# Patient Record
Sex: FEMALE | Race: WHITE | NOT HISPANIC OR LATINO | Employment: OTHER | URBAN - METROPOLITAN AREA
[De-identification: names, ages, dates, MRNs, and addresses within clinical notes are randomized per-mention and may not be internally consistent; named-entity substitution may affect disease eponyms.]

---

## 2019-08-13 ENCOUNTER — APPOINTMENT (EMERGENCY)
Dept: CT IMAGING | Facility: HOSPITAL | Age: 74
DRG: 372 | End: 2019-08-13
Payer: MEDICARE

## 2019-08-13 ENCOUNTER — HOSPITAL ENCOUNTER (INPATIENT)
Facility: HOSPITAL | Age: 74
LOS: 2 days | Discharge: HOME | DRG: 372 | End: 2019-08-16
Attending: EMERGENCY MEDICINE | Admitting: INTERNAL MEDICINE
Payer: MEDICARE

## 2019-08-13 DIAGNOSIS — K62.5 RECTAL BLEEDING: Primary | ICD-10-CM

## 2019-08-13 DIAGNOSIS — K52.9 COLITIS: ICD-10-CM

## 2019-08-13 PROBLEM — D72.829 LEUKOCYTOSIS: Status: ACTIVE | Noted: 2019-08-13

## 2019-08-13 PROBLEM — N32.81 OVERACTIVE BLADDER: Status: ACTIVE | Noted: 2019-08-13

## 2019-08-13 PROBLEM — E87.5 HYPERKALEMIA: Status: ACTIVE | Noted: 2019-08-13

## 2019-08-13 PROBLEM — Z86.39 HISTORY OF HYPOTHYROIDISM: Status: ACTIVE | Noted: 2019-08-13

## 2019-08-13 PROBLEM — Z86.79 HISTORY OF HYPERTENSION: Status: ACTIVE | Noted: 2019-08-13

## 2019-08-13 PROBLEM — N17.9 AKI (ACUTE KIDNEY INJURY) (HCC): Status: ACTIVE | Noted: 2019-08-13

## 2019-08-13 PROBLEM — R00.0 TACHYCARDIA: Status: ACTIVE | Noted: 2019-08-13

## 2019-08-13 LAB
ALBUMIN SERPL BCP-MCNC: 3.7 G/DL (ref 3.5–5)
ALP SERPL-CCNC: 75 U/L (ref 46–116)
ALT SERPL W P-5'-P-CCNC: 25 U/L (ref 12–78)
ANION GAP SERPL CALCULATED.3IONS-SCNC: 7 MMOL/L (ref 4–13)
APTT PPP: 27 SECONDS (ref 23–37)
AST SERPL W P-5'-P-CCNC: 23 U/L (ref 5–45)
BASOPHILS # BLD AUTO: 0.02 THOUSANDS/ΜL (ref 0–0.1)
BASOPHILS NFR BLD AUTO: 0 % (ref 0–1)
BILIRUB SERPL-MCNC: 0.9 MG/DL (ref 0.2–1)
BUN SERPL-MCNC: 36 MG/DL (ref 5–25)
CALCIUM SERPL-MCNC: 9.9 MG/DL (ref 8.3–10.1)
CHLORIDE SERPL-SCNC: 100 MMOL/L (ref 100–108)
CO2 SERPL-SCNC: 29 MMOL/L (ref 21–32)
CREAT SERPL-MCNC: 1.85 MG/DL (ref 0.6–1.3)
EOSINOPHIL # BLD AUTO: 0 THOUSAND/ΜL (ref 0–0.61)
EOSINOPHIL NFR BLD AUTO: 0 % (ref 0–6)
ERYTHROCYTE [DISTWIDTH] IN BLOOD BY AUTOMATED COUNT: 13 % (ref 11.6–15.1)
GFR SERPL CREATININE-BSD FRML MDRD: 27 ML/MIN/1.73SQ M
GLUCOSE SERPL-MCNC: 131 MG/DL (ref 65–140)
HCT VFR BLD AUTO: 40.9 % (ref 34.8–46.1)
HGB BLD-MCNC: 13.3 G/DL (ref 11.5–15.4)
IMM GRANULOCYTES # BLD AUTO: 0.03 THOUSAND/UL (ref 0–0.2)
IMM GRANULOCYTES NFR BLD AUTO: 0 % (ref 0–2)
INR PPP: 1.04 (ref 0.84–1.19)
LIPASE SERPL-CCNC: 183 U/L (ref 73–393)
LYMPHOCYTES # BLD AUTO: 0.55 THOUSANDS/ΜL (ref 0.6–4.47)
LYMPHOCYTES NFR BLD AUTO: 5 % (ref 14–44)
MCH RBC QN AUTO: 30.9 PG (ref 26.8–34.3)
MCHC RBC AUTO-ENTMCNC: 32.5 G/DL (ref 31.4–37.4)
MCV RBC AUTO: 95 FL (ref 82–98)
MONOCYTES # BLD AUTO: 0.41 THOUSAND/ΜL (ref 0.17–1.22)
MONOCYTES NFR BLD AUTO: 4 % (ref 4–12)
NEUTROPHILS # BLD AUTO: 9.29 THOUSANDS/ΜL (ref 1.85–7.62)
NEUTS SEG NFR BLD AUTO: 91 % (ref 43–75)
NRBC BLD AUTO-RTO: 0 /100 WBCS
PLATELET # BLD AUTO: 224 THOUSANDS/UL (ref 149–390)
PMV BLD AUTO: 11.1 FL (ref 8.9–12.7)
POTASSIUM SERPL-SCNC: 5.5 MMOL/L (ref 3.5–5.3)
PROT SERPL-MCNC: 7.3 G/DL (ref 6.4–8.2)
PROTHROMBIN TIME: 13 SECONDS (ref 11.6–14.5)
RBC # BLD AUTO: 4.31 MILLION/UL (ref 3.81–5.12)
SODIUM SERPL-SCNC: 136 MMOL/L (ref 136–145)
TSH SERPL DL<=0.05 MIU/L-ACNC: 6.08 UIU/ML (ref 0.36–3.74)
WBC # BLD AUTO: 10.3 THOUSAND/UL (ref 4.31–10.16)

## 2019-08-13 PROCEDURE — 99284 EMERGENCY DEPT VISIT MOD MDM: CPT | Performed by: PHYSICIAN ASSISTANT

## 2019-08-13 PROCEDURE — 85610 PROTHROMBIN TIME: CPT | Performed by: PHYSICIAN ASSISTANT

## 2019-08-13 PROCEDURE — 80053 COMPREHEN METABOLIC PANEL: CPT | Performed by: EMERGENCY MEDICINE

## 2019-08-13 PROCEDURE — 85730 THROMBOPLASTIN TIME PARTIAL: CPT | Performed by: PHYSICIAN ASSISTANT

## 2019-08-13 PROCEDURE — 99285 EMERGENCY DEPT VISIT HI MDM: CPT

## 2019-08-13 PROCEDURE — 99220 PR INITIAL OBSERVATION CARE/DAY 70 MINUTES: CPT | Performed by: INTERNAL MEDICINE

## 2019-08-13 PROCEDURE — 85025 COMPLETE CBC W/AUTO DIFF WBC: CPT | Performed by: EMERGENCY MEDICINE

## 2019-08-13 PROCEDURE — 74176 CT ABD & PELVIS W/O CONTRAST: CPT

## 2019-08-13 PROCEDURE — 84443 ASSAY THYROID STIM HORMONE: CPT | Performed by: PHYSICIAN ASSISTANT

## 2019-08-13 PROCEDURE — 1124F ACP DISCUSS-NO DSCNMKR DOCD: CPT | Performed by: INTERNAL MEDICINE

## 2019-08-13 PROCEDURE — 36415 COLL VENOUS BLD VENIPUNCTURE: CPT | Performed by: EMERGENCY MEDICINE

## 2019-08-13 PROCEDURE — 83690 ASSAY OF LIPASE: CPT | Performed by: PHYSICIAN ASSISTANT

## 2019-08-13 RX ORDER — HEPARIN SODIUM 5000 [USP'U]/ML
5000 INJECTION, SOLUTION INTRAVENOUS; SUBCUTANEOUS EVERY 8 HOURS SCHEDULED
Status: DISCONTINUED | OUTPATIENT
Start: 2019-08-13 | End: 2019-08-16 | Stop reason: HOSPADM

## 2019-08-13 RX ORDER — CIPROFLOXACIN 2 MG/ML
400 INJECTION, SOLUTION INTRAVENOUS ONCE
Status: COMPLETED | OUTPATIENT
Start: 2019-08-13 | End: 2019-08-13

## 2019-08-13 RX ORDER — LANOLIN ALCOHOL/MO/W.PET/CERES
3 CREAM (GRAM) TOPICAL
Status: DISCONTINUED | OUTPATIENT
Start: 2019-08-13 | End: 2019-08-16 | Stop reason: HOSPADM

## 2019-08-13 RX ORDER — CIPROFLOXACIN 2 MG/ML
400 INJECTION, SOLUTION INTRAVENOUS EVERY 24 HOURS
Status: DISCONTINUED | OUTPATIENT
Start: 2019-08-14 | End: 2019-08-16 | Stop reason: HOSPADM

## 2019-08-13 RX ORDER — SODIUM CHLORIDE 9 MG/ML
100 INJECTION, SOLUTION INTRAVENOUS CONTINUOUS
Status: DISCONTINUED | OUTPATIENT
Start: 2019-08-13 | End: 2019-08-16 | Stop reason: HOSPADM

## 2019-08-13 RX ADMIN — HEPARIN SODIUM 5000 UNITS: 5000 INJECTION INTRAVENOUS; SUBCUTANEOUS at 22:16

## 2019-08-13 RX ADMIN — CIPROFLOXACIN 400 MG: 2 INJECTION, SOLUTION INTRAVENOUS at 18:03

## 2019-08-13 RX ADMIN — SODIUM CHLORIDE 100 ML/HR: 0.9 INJECTION, SOLUTION INTRAVENOUS at 21:09

## 2019-08-13 RX ADMIN — METRONIDAZOLE 500 MG: 500 INJECTION, SOLUTION INTRAVENOUS at 17:02

## 2019-08-13 NOTE — H&P
H&P- Chuy Coulter 1945, 72 y o  female MRN: [id number]    Unit/Bed#: -01 Encounter: [id number]    Primary Care Provider: No primary care provider on file  Date and time admitted to hospital: 8/13/2019 12:07 PM        Colitis  Assessment & Plan  -multiple episodes of bloody diarrhea with abdominal pain  -CT-abd: positive for Acute Colitis  -con't IV Ciprofloxacin 200mg q24 and Flagyl IV 500mg q8  -con't IVF  -pending C  Diff toxin and Stool Enteric Bacterial panel  -consider GI consult if patient symptoms worsen   -follow up out patient with GI upon discharge  HERB (acute kidney injury) ([facility] [address] )  Assessment & Plan  -Cr 1 85; likely due to volume loss from diarrhea and vomitting  -con't IVF, monitor Cr daily  -avoid nephrotoxins; hold Lisinopril    Hyperkalemia  Assessment & Plan  -K 5 5  -repeat BMP pending, following IVF  -monitor      History of Overactive bladder  Assessment & Plan  -hold home med, Myrbetriq    History of hypertension  Assessment & Plan  -BP stable, con't to monitor  -hold lisinopril due to HERB    History of hypothyroidism  -pending TSH  -con't levothyroxine    VTE Prophylaxis: Heparin  / sequential compression device   Code Status: Full Code  POLST: not addressed  Discussion with family: Yes    Anticipated Length of Stay:  Patient will be admitted on an Observation basis with an anticipated length of stay of  1- 2 midnights  Justification for Hospital Stay: Colitis, on IV antibiotics and IVF for HERB    Total Time for Visit, including Counseling / Coordination of Care: 45 minutes  Greater than 50% of this total time spent on direct patient counseling and coordination of care  Chief Complaint:   Bloody diarrhea and abdominal pain    History of Present Illness:    Chuy Coulter is a 72 y o  female who presents to the ED with a cc of bloody diarrhea and abdominal pain   The patient is visiting from [place], she attended a BB last night where she had 4 strong mixed alcoholic drinks  Around midnight, she started having multiple episodes of bright red bloody diarrhea  It was assoc with diffuse abdominal pain, nausea and 1 episode of non-bloody vomitting  The patient's last episode of bloody diarrhea was downstairs in our ED  She admits to a similar episode 12 years ago, for which she was admitted to the hospital  She states they did a colonoscopy then, and diagnosed her with either Diverticulitis or Colitis, but she failed to follow up upon discharge  The patient denies that she is on any blood thinners  Review of Systems:    Review of Systems   Constitutional: Negative for appetite change, diaphoresis, fatigue and unexpected weight change  HENT: Negative for rhinorrhea, sinus pain and sore throat  Eyes: Negative for visual disturbance  Respiratory: Negative for cough, chest tightness and shortness of breath  Cardiovascular: Negative for chest pain and leg swelling  Gastrointestinal: Positive for blood in stool and diarrhea  Negative for abdominal distention, abdominal pain, nausea, rectal pain and vomiting  Genitourinary: Positive for frequency  Negative for hematuria and urgency  Musculoskeletal: Positive for arthralgias and back pain  Neurological: Negative for dizziness, weakness, light-headedness and headaches  Hematological: Does not bruise/bleed easily  Psychiatric/Behavioral: Negative for confusion         Past Medical and Surgical History:     PMH: HTN, Hypothyroidism, Scoliosis, Overactive bladder    PSH: multiple back/neck surgeries, C/S x2, tonsillectomy    Meds/Allergies:    Prior to Admission medications    Not on File     Med reviewed with patient, will also follow up with her pharmacy    Allergies: No Known Allergies    Social History:     Marital Status: /Civil Union   Patient Pre-hospital Living Situation: lives with  in [place]   Patient Pre-hospital Diet Restrictions: none  Substance Use History:   Social History     Substance and Sexual Activity   Alcohol Use Never    Frequency: Never     Social History     Tobacco Use   Smoking Status Never Smoker   Smokeless Tobacco Never Used     Social History     Substance and Sexual Activity   Drug Use Never       Family History:    non-contributory    Physical Exam:     Vitals:   Blood Pressure: 132/63 (08/13/19 1739)  Pulse: 89 (08/13/19 1739)  Temperature: 99 2 °F (37 3 °C) (08/13/19 1739)  Temp Source: Oral (08/13/19 1739)  Respirations: 18 (08/13/19 1739)  Height: 4' 11" (149 9 cm) (08/13/19 1739)  Weight - Scale: 55 9 kg (123 lb 3 8 oz) (08/13/19 1739)  SpO2: 96 % (08/13/19 1739)    Physical Exam   Constitutional: She is oriented to person, place, and time  She appears well-developed and well-nourished  HENT:   Head: Normocephalic and atraumatic  Eyes: Pupils are equal, round, and reactive to light  Conjunctivae and EOM are normal    Neck: Normal range of motion  Neck supple  Cardiovascular: Normal rate, regular rhythm and normal heart sounds  Pulmonary/Chest: Effort normal and breath sounds normal    Abdominal: Soft  Bowel sounds are normal  She exhibits no distension  There is tenderness  There is no rebound and no guarding  Mild epigastric tenderness with palpation   Neurological: She is alert and oriented to person, place, and time  Skin: Skin is warm and dry  Capillary refill takes less than 2 seconds  Psychiatric: She has a normal mood and affect  Additional Data:     Lab Results: I have personally reviewed pertinent reports        Results from last 7 days   Lab Units 08/13/19  1244   WBC Thousand/uL 10 30*   HEMOGLOBIN g/dL 13 3   HEMATOCRIT % 40 9   PLATELETS Thousands/uL 224   NEUTROS PCT % 91*   LYMPHS PCT % 5*   MONOS PCT % 4   EOS PCT % 0     Results from last 7 days   Lab Units 08/13/19  1244   SODIUM mmol/L 136   POTASSIUM mmol/L 5 5*   CHLORIDE mmol/L 100   CO2 mmol/L 29   BUN mg/dL 36*   CREATININE mg/dL 1 85*   ANION GAP mmol/L 7   CALCIUM mg/dL 9 9   ALBUMIN g/dL 3 7   TOTAL BILIRUBIN mg/dL 0 90   ALK PHOS U/L 75   ALT U/L 25   AST U/L 23   GLUCOSE RANDOM mg/dL 131     Results from last 7 days   Lab Units 08/13/19  1300   INR  1 04                   Imaging: I have personally reviewed pertinent reports  CT abdomen pelvis wo contrast   Final Result by Keturah Jerome MD (08/13 1513)      Findings most consistent with acute colitis with most likely diagnostic considerations including infectious colitis or inflammatory bowel disease  Workstation performed: [id number]                 [facility] Reviewed: Yes     ** Please Note: This note has been constructed using a voice recognition system   **

## 2019-08-13 NOTE — ED NOTES
Verbal to continue holding off on administering IV abx at this time       Sj Keating RN  08/13/19 7906

## 2019-08-13 NOTE — ED PROVIDER NOTES
History  Chief Complaint   Patient presents with    Rectal Bleeding     patient reports rectal bleeding and vomiting last night  thinks it started with a bowel movement  reports currently bleeding w/ pad on  daughter thinks she might have a fever from last night  28-year-old female presents to the emergency department with complaints of rectal bleeding  States she started with bleeding last night which she describes as bright red in nature and painless  States she has had diffuse abdominal pain as well as some nausea with at least 1 episode of vomiting  Describes pain as sharp and cramping in nature  No known fevers  No history of similar  Does not take any blood thinners  No history of similar symptoms  History provided by:  Patient   used: No        None       History reviewed  No pertinent past medical history  History reviewed  No pertinent surgical history  History reviewed  No pertinent family history  I have reviewed and agree with the history as documented  Social History     Tobacco Use    Smoking status: Never Smoker    Smokeless tobacco: Never Used   Substance Use Topics    Alcohol use: Never     Frequency: Never    Drug use: Never        Review of Systems   Constitutional: Negative for activity change, appetite change, chills and fever  HENT: Negative for congestion, dental problem, drooling, ear discharge, ear pain, mouth sores, nosebleeds, rhinorrhea, sore throat and trouble swallowing  Eyes: Negative for pain, discharge and itching  Respiratory: Negative for cough, chest tightness, shortness of breath and wheezing  Cardiovascular: Negative for chest pain and palpitations  Gastrointestinal: Positive for abdominal pain, blood in stool, diarrhea and nausea  Negative for constipation and vomiting  Endocrine: Negative for cold intolerance and heat intolerance     Genitourinary: Negative for difficulty urinating, dysuria, flank pain, frequency and urgency  Skin: Negative for rash and wound  Allergic/Immunologic: Negative for food allergies and immunocompromised state  Neurological: Negative for dizziness, seizures, syncope, weakness, numbness and headaches  Psychiatric/Behavioral: Negative for agitation, behavioral problems and confusion  Physical Exam  Physical Exam   Constitutional: She is oriented to person, place, and time  She appears well-developed and well-nourished  No distress  HENT:   Head: Normocephalic and atraumatic  Right Ear: External ear normal    Left Ear: External ear normal    Mouth/Throat: Oropharynx is clear and moist  No oropharyngeal exudate  Eyes: Conjunctivae are normal    Neck: No JVD present  No tracheal deviation present  Cardiovascular: Normal rate, regular rhythm and normal heart sounds  Exam reveals no gallop and no friction rub  No murmur heard  Pulmonary/Chest: Effort normal and breath sounds normal  No respiratory distress  She has no wheezes  She has no rales  She exhibits no tenderness  Abdominal: Soft  Bowel sounds are normal  She exhibits no distension  There is generalized tenderness  There is no guarding  Genitourinary: Rectal exam shows guaiac positive stool  Musculoskeletal: Normal range of motion  She exhibits no edema, tenderness or deformity  Lymphadenopathy:     She has no cervical adenopathy  Neurological: She is alert and oriented to person, place, and time  Skin: Skin is warm and dry  No rash noted  She is not diaphoretic  No erythema  Psychiatric: She has a normal mood and affect  Her behavior is normal    Nursing note and vitals reviewed        Vital Signs  ED Triage Vitals [08/13/19 1121]   Temperature Pulse Respirations Blood Pressure SpO2   98 3 °F (36 8 °C) 95 18 123/69 98 %      Temp Source Heart Rate Source Patient Position - Orthostatic VS BP Location FiO2 (%)   Oral Monitor Sitting Left arm --      Pain Score       --           Vitals:    08/13/19 1258 08/13/19 1523 08/13/19 1701 08/13/19 1739   BP: (!) 177/72 115/51 101/71 132/63   Pulse: 94 97 99 89   Patient Position - Orthostatic VS: Sitting Sitting Sitting          Visual Acuity      ED Medications  Medications   ciprofloxacin (CIPRO) IVPB (premix) 400 mg (has no administration in time range)   heparin (porcine) subcutaneous injection 5,000 Units (has no administration in time range)   metroNIDAZOLE (FLAGYL) IVPB (premix) 500 mg (500 mg Intravenous New Bag 8/13/19 1702)       Diagnostic Studies  Results Reviewed     Procedure Component Value Units Date/Time    CBC and differential [[id number]]  (Abnormal) Collected:  08/13/19 1244    Lab Status:  Final result Specimen:  Blood from Arm, Right Updated:  08/13/19 1318     WBC 10 30 Thousand/uL      RBC 4 31 Million/uL      Hemoglobin 13 3 g/dL      Hematocrit 40 9 %      MCV 95 fL      MCH 30 9 pg      MCHC 32 5 g/dL      RDW 13 0 %      MPV 11 1 fL      Platelets 262 Thousands/uL      nRBC 0 /100 WBCs      Neutrophils Relative 91 %      Immat GRANS % 0 %      Lymphocytes Relative 5 %      Monocytes Relative 4 %      Eosinophils Relative 0 %      Basophils Relative 0 %      Neutrophils Absolute 9 29 Thousands/µL      Immature Grans Absolute 0 03 Thousand/uL      Lymphocytes Absolute 0 55 Thousands/µL      Monocytes Absolute 0 41 Thousand/µL      Eosinophils Absolute 0 00 Thousand/µL      Basophils Absolute 0 02 Thousands/µL     Narrative: This is an appended report  These results have been appended to a previously verified report      Protime-INR [[id number]]  (Normal) Collected:  08/13/19 1300    Lab Status:  Final result Specimen:  Blood from Arm, Right Updated:  08/13/19 1314     Protime 13 0 seconds      INR 1 04    APTT [[id number]]  (Normal) Collected:  08/13/19 1300    Lab Status:  Final result Specimen:  Blood from Arm, Right Updated:  08/13/19 1314     PTT 27 seconds     Lipase [[id number]]  (Normal) Collected:  08/13/19 1300    Lab Status:  Final result Specimen:  Blood from Arm, Right Updated:  08/13/19 1311     Lipase 183 u/L     Comprehensive metabolic panel [[id number]]  (Abnormal) Collected:  08/13/19 1244    Lab Status:  Final result Specimen:  Blood from Arm, Right Updated:  08/13/19 1304     Sodium 136 mmol/L      Potassium 5 5 mmol/L      Chloride 100 mmol/L      CO2 29 mmol/L      ANION GAP 7 mmol/L      BUN 36 mg/dL      Creatinine 1 85 mg/dL      Glucose 131 mg/dL      Calcium 9 9 mg/dL      AST 23 U/L      ALT 25 U/L      Alkaline Phosphatase 75 U/L      Total Protein 7 3 g/dL      Albumin 3 7 g/dL      Total Bilirubin 0 90 mg/dL      eGFR 27 ml/min/1 73sq m     Narrative:       [place] guidelines for Chronic Kidney Disease (CKD):     Stage 1 with normal or high GFR (GFR > 90 mL/min/1 73 square meters)    Stage 2 Mild CKD (GFR = 60-89 mL/min/1 73 square meters)    Stage 3A Moderate CKD (GFR = 45-59 mL/min/1 73 square meters)    Stage 3B Moderate CKD (GFR = 30-44 mL/min/1 73 square meters)    Stage 4 Severe CKD (GFR = 15-29 mL/min/1 73 square meters)    Stage 5 End Stage CKD (GFR <15 mL/min/1 73 square meters)  Note: GFR calculation is accurate only with a steady state creatinine                 CT abdomen pelvis wo contrast   Final Result by Alejandro Burgos MD (08/13 1513)      Findings most consistent with acute colitis with most likely diagnostic considerations including infectious colitis or inflammatory bowel disease  Workstation performed: [id number]                    Procedures  Procedures       ED Course  ED Course as of Aug 13 1752   Tue Aug 13, 2019   1618 Discussed results with patient  Agreeable to inpatient admission, but not to observation  Will discuss with hospitalist       [phone] with SL[facility]  Unsure if patient handy qualify as inpatient initially  Recommends speaking with case management                        Initial Sepsis Screening     Row Name 08/13/19 1700                Is the patient's history suggestive of a new or worsening infection? (!) Yes (Proceed)  -EP        Suspected source of infection  --        Are two or more of the following signs & symptoms of infection both present and new to the patient? No  -EP        Indicate SIRS criteria  --        If the answer is yes to both questions, suspicion of sepsis is present  --        If severe sepsis is present AND tissue hypoperfusion perists in the hour after fluid resuscitation or lactate > 4, the patient meets criteria for SEPTIC SHOCK  --        Are any of the following organ dysfunction criteria present within 6 hours of suspected infection and SIRS criteria that are NOT considered to be chronic conditions?   --        Organ dysfunction  --        Date of presentation of severe sepsis  --        Time of presentation of severe sepsis  --        Tissue hypoperfusion persists in the hour after crystalloid fluid administration, evidenced, by either:  --        Was hypotension present within one hour of the conclusion of crystalloid fluid administration?  --        Date of presentation of septic shock  --        Time of presentation of septic shock  --          User Key  (r) = Recorded By, (t) = Taken By, (c) = Cosigned By    [facility] [address] Name Provider Brennan Fuentes MD Physician                  MDM  Number of Diagnoses or Management Options  Colitis:   Rectal bleeding:   Diagnosis management comments: Differential diagnosis includes but not limited to:  Colitis, gastroenteritis, anemia, intra-abdominal infection         Amount and/or Complexity of Data Reviewed  Clinical lab tests: ordered and reviewed  Tests in the radiology section of CPT®: ordered and reviewed  Review and summarize past medical records: yes        Disposition  Final diagnoses:   Rectal bleeding   Colitis     Time reflects when diagnosis was documented in both MDM as applicable and the Disposition within this note     Time User Action Codes Description Comment    8/13/2019  4:53 PM Jake Jarvis Add [K62 5] Rectal bleeding     8/13/2019  4:53 PM Elaine Roman Add [K52 9] Colitis       ED Disposition     ED Disposition Condition Date/Time Comment    Admit Stable Tue Aug 13, 2019  4:53 PM Case was discussed with CHET and the patient's admission status was agreed to be Admission Status: observation status to the service of Dr Ch Daily   Follow-up Information    None         There are no discharge medications for this patient  No discharge procedures on file      ED Provider  Electronically Signed by           Geneva Banegas PA-C  08/13/19 2849

## 2019-08-13 NOTE — ED NOTES
Patient asking if she can have water, MAISHA Roman made aware and verbal okay  Patient provided with ice water, no there complaints at this time       Jerry Melendez RN  08/13/19 8482

## 2019-08-13 NOTE — ASSESSMENT & PLAN NOTE
-multiple episodes of bloody diarrhea with abdominal pain  -CT-abd: positive for Acute Colitis  -con't IV Ciprofloxacin 200mg q24 and Flagyl IV 500mg q8  -con't IVF  -pending C  Diff toxin and Stool Enteric Bacterial panel  -consider GI consult if patient symptoms worsen   -follow up out patient with GI upon discharge

## 2019-08-13 NOTE — ED NOTES
Verbal per MAISHA HANNA [facility], hold off on administering flagyl and cipro IV abx until verbal okay to hang by provider        Evelyn Smith RN  08/13/19 0428

## 2019-08-13 NOTE — PLAN OF CARE
Problem: Potential for Falls  Goal: Patient will remain free of falls  Description  INTERVENTIONS:  - Assess patient frequently for physical needs  -  Identify cognitive and physical deficits and behaviors that affect risk of falls    -  [place] fall precautions as indicated by assessment   - Educate patient/family on patient safety including physical limitations  - Instruct patient to call for assistance with activity based on assessment  - Modify environment to reduce risk of injury  - Consider OT/PT consult to assist with strengthening/mobility  Outcome: Progressing     Problem: PAIN - ADULT  Goal: Verbalizes/displays adequate comfort level or baseline comfort level  Description  Interventions:  - Encourage patient to monitor pain and request assistance  - Assess pain using appropriate pain scale  - Administer analgesics based on type and severity of pain and evaluate response  - Implement non-pharmacological measures as appropriate and evaluate response  - Consider cultural and social influences on pain and pain management  - Notify physician/advanced practitioner if interventions unsuccessful or patient reports new pain  Outcome: Progressing     Problem: INFECTION - ADULT  Goal: Absence or prevention of progression during hospitalization  Description  INTERVENTIONS:  - Assess and monitor for signs and symptoms of infection  - Monitor lab/diagnostic results  - Monitor all insertion sites, i e  indwelling lines, tubes, and drains  - Monitor endotracheal if appropriate and nasal secretions for changes in amount and color  - [place] appropriate cooling/warming therapies per order  - Administer medications as ordered  - Instruct and encourage patient and family to use good hand hygiene technique  - Identify and instruct in appropriate isolation precautions for identified infection/condition  Outcome: Progressing  Goal: Absence of fever/infection during neutropenic period  Description  INTERVENTIONS:  - Monitor WBC    Outcome: Progressing     Problem: SAFETY ADULT  Goal: Patient will remain free of falls  Description  INTERVENTIONS:  - Assess patient frequently for physical needs  -  Identify cognitive and physical deficits and behaviors that affect risk of falls    -  [place] fall precautions as indicated by assessment   - Educate patient/family on patient safety including physical limitations  - Instruct patient to call for assistance with activity based on assessment  - Modify environment to reduce risk of injury  - Consider OT/PT consult to assist with strengthening/mobility  Outcome: Progressing  Goal: Maintain or return to baseline ADL function  Description  INTERVENTIONS:  -  Assess patient's ability to carry out ADLs; assess patient's baseline for ADL function and identify physical deficits which impact ability to perform ADLs (bathing, care of mouth/teeth, toileting, grooming, dressing, etc )  - Assess/evaluate cause of self-care deficits   - Assess range of motion  - Assess patient's mobility; develop plan if impaired  - Assess patient's need for assistive devices and provide as appropriate  - Encourage maximum independence but intervene and supervise when necessary  - Involve family in performance of ADLs  - Assess for home care needs following discharge   - Consider OT consult to assist with ADL evaluation and planning for discharge  - Provide patient education as appropriate  Outcome: Progressing  Goal: Maintain or return mobility status to optimal level  Description  INTERVENTIONS:  - Assess patient's baseline mobility status (ambulation, transfers, stairs, etc )    - Identify cognitive and physical deficits and behaviors that affect mobility  - Identify mobility aids required to assist with transfers and/or ambulation (gait belt, sit-to-stand, lift, walker, cane, etc )  - [place] fall precautions as indicated by assessment  - Record patient progress and toleration of activity level on Mobility SBAR; progress patient to next Phase/Stage  - Instruct patient to call for assistance with activity based on assessment  - Consider rehabilitation consult to assist with strengthening/weightbearing, etc   Outcome: Progressing     Problem: DISCHARGE PLANNING  Goal: Discharge to home or other facility with appropriate resources  Description  INTERVENTIONS:  - Identify barriers to discharge w/patient and caregiver  - Arrange for needed discharge resources and transportation as appropriate  - Identify discharge learning needs (meds, wound care, etc )  - Arrange for interpretive services to assist at discharge as needed  - Refer to Case Management Department for coordinating discharge planning if the patient needs post-hospital services based on physician/advanced practitioner order or complex needs related to functional status, cognitive ability, or social support system  Outcome: Progressing     Problem: Knowledge Deficit  Goal: Patient/family/caregiver demonstrates understanding of disease process, treatment plan, medications, and discharge instructions  Description  Complete learning assessment and assess knowledge base    Interventions:  - Provide teaching at level of understanding  - Provide teaching via preferred learning methods  Outcome: Progressing

## 2019-08-13 NOTE — ASSESSMENT & PLAN NOTE
-Cr 1 85; likely due to volume loss from diarrhea and vomitting  -con't IVF, monitor Cr daily  -avoid nephrotoxins; hold Lisinopril

## 2019-08-14 LAB
ALBUMIN SERPL BCP-MCNC: 2.9 G/DL (ref 3.5–5)
ALP SERPL-CCNC: 57 U/L (ref 46–116)
ALT SERPL W P-5'-P-CCNC: 24 U/L (ref 12–78)
ANION GAP SERPL CALCULATED.3IONS-SCNC: 8 MMOL/L (ref 4–13)
AST SERPL W P-5'-P-CCNC: 24 U/L (ref 5–45)
BILIRUB SERPL-MCNC: 0.6 MG/DL (ref 0.2–1)
BUN SERPL-MCNC: 30 MG/DL (ref 5–25)
CALCIUM SERPL-MCNC: 8.5 MG/DL (ref 8.3–10.1)
CHLORIDE SERPL-SCNC: 107 MMOL/L (ref 100–108)
CO2 SERPL-SCNC: 26 MMOL/L (ref 21–32)
CREAT SERPL-MCNC: 1.34 MG/DL (ref 0.6–1.3)
ERYTHROCYTE [DISTWIDTH] IN BLOOD BY AUTOMATED COUNT: 13.2 % (ref 11.6–15.1)
GFR SERPL CREATININE-BSD FRML MDRD: 40 ML/MIN/1.73SQ M
GLUCOSE SERPL-MCNC: 110 MG/DL (ref 65–140)
HCT VFR BLD AUTO: 32.6 % (ref 34.8–46.1)
HCT VFR BLD AUTO: 33.9 % (ref 34.8–46.1)
HGB BLD-MCNC: 10.5 G/DL (ref 11.5–15.4)
HGB BLD-MCNC: 10.8 G/DL (ref 11.5–15.4)
MCH RBC QN AUTO: 30.3 PG (ref 26.8–34.3)
MCHC RBC AUTO-ENTMCNC: 31.9 G/DL (ref 31.4–37.4)
MCV RBC AUTO: 95 FL (ref 82–98)
PLATELET # BLD AUTO: 172 THOUSANDS/UL (ref 149–390)
PMV BLD AUTO: 10.9 FL (ref 8.9–12.7)
POTASSIUM SERPL-SCNC: 4.6 MMOL/L (ref 3.5–5.3)
PROT SERPL-MCNC: 5.7 G/DL (ref 6.4–8.2)
RBC # BLD AUTO: 3.57 MILLION/UL (ref 3.81–5.12)
SODIUM SERPL-SCNC: 141 MMOL/L (ref 136–145)
WBC # BLD AUTO: 7.67 THOUSAND/UL (ref 4.31–10.16)

## 2019-08-14 PROCEDURE — 85014 HEMATOCRIT: CPT | Performed by: INTERNAL MEDICINE

## 2019-08-14 PROCEDURE — 80053 COMPREHEN METABOLIC PANEL: CPT | Performed by: INTERNAL MEDICINE

## 2019-08-14 PROCEDURE — 87493 C DIFF AMPLIFIED PROBE: CPT | Performed by: INTERNAL MEDICINE

## 2019-08-14 PROCEDURE — 99222 1ST HOSP IP/OBS MODERATE 55: CPT | Performed by: INTERNAL MEDICINE

## 2019-08-14 PROCEDURE — 87505 NFCT AGENT DETECTION GI: CPT | Performed by: INTERNAL MEDICINE

## 2019-08-14 PROCEDURE — 85027 COMPLETE CBC AUTOMATED: CPT | Performed by: INTERNAL MEDICINE

## 2019-08-14 PROCEDURE — 85018 HEMOGLOBIN: CPT | Performed by: INTERNAL MEDICINE

## 2019-08-14 PROCEDURE — 99225 PR SBSQ OBSERVATION CARE/DAY 25 MINUTES: CPT | Performed by: INTERNAL MEDICINE

## 2019-08-14 RX ADMIN — METRONIDAZOLE 500 MG: 500 INJECTION, SOLUTION INTRAVENOUS at 23:40

## 2019-08-14 RX ADMIN — METRONIDAZOLE 500 MG: 500 INJECTION, SOLUTION INTRAVENOUS at 00:04

## 2019-08-14 RX ADMIN — HEPARIN SODIUM 5000 UNITS: 5000 INJECTION INTRAVENOUS; SUBCUTANEOUS at 05:32

## 2019-08-14 RX ADMIN — HEPARIN SODIUM 5000 UNITS: 5000 INJECTION INTRAVENOUS; SUBCUTANEOUS at 21:15

## 2019-08-14 RX ADMIN — SODIUM CHLORIDE 100 ML/HR: 0.9 INJECTION, SOLUTION INTRAVENOUS at 15:52

## 2019-08-14 RX ADMIN — METRONIDAZOLE 500 MG: 500 INJECTION, SOLUTION INTRAVENOUS at 09:01

## 2019-08-14 RX ADMIN — MELATONIN 3 MG: at 21:22

## 2019-08-14 RX ADMIN — METRONIDAZOLE 500 MG: 500 INJECTION, SOLUTION INTRAVENOUS at 15:53

## 2019-08-14 RX ADMIN — HEPARIN SODIUM 5000 UNITS: 5000 INJECTION INTRAVENOUS; SUBCUTANEOUS at 13:44

## 2019-08-14 RX ADMIN — SODIUM CHLORIDE 100 ML/HR: 0.9 INJECTION, SOLUTION INTRAVENOUS at 05:44

## 2019-08-14 RX ADMIN — CIPROFLOXACIN 400 MG: 2 INJECTION, SOLUTION INTRAVENOUS at 17:45

## 2019-08-14 NOTE — ASSESSMENT & PLAN NOTE
-Cr 1 85; likely due to volume loss from diarrhea and vomitting --> Resolving  -con't IVF, monitor Cr daily  -avoid nephrotoxins; hold Lisinopril

## 2019-08-14 NOTE — PROGRESS NOTES
Called by nursing patient with feeling of bloating  Also found to have bright red blood in her pad  Suspected rectal bleeding  Patient denies nausea vomiting  She is complaining some bloating  Vitals:    08/14/19 0627   BP: 134/68   Pulse: 91   Resp: 18   Temp: 98 6 °F (37 °C)   SpO2: 96%   Physical Exam     Patient is alert oriented nontoxic appearing very comfortable speaking with friend at bedside sitting up in chair  Lungs clear to auscultation bilaterally  Cardiac regular rhythm no S3  Abdomen soft nontender mildly distended compared to earlier today no guarding no rebound normoactive bowel sounds  Extremities no cyanosis clubbing or edema  Assessment/Plan     Patient with continued rectal bleeding secondary to colitis  Questionable infectious versus ischemic versus inflammatory    Due to continued rectal bleeding and bloating will changed to clear liquid diet will also consult GI patient also on stable for discharge would be transferred to inpatient status

## 2019-08-14 NOTE — CONSULTS
Consultation - [facility]) [facility]  Scooter Pardo 72 y o  female MRN: [id number]  Unit/Bed#: -01 Encounter: [id number]        Inpatient consult to gastroenterology  Consult performed by: Keaton Plascnecia PA-C  Consult ordered by: Nolberto Mckee MD          Reason for Consult / Principal Problem: Colitis    HPI: Scooter Pardo is a 72y o  year old female reporting no significant medical history who presented to the emergency room yesterday morning with complaints of rectal bleeding and vomiting which started the previous night  She 1st noticed it when she was having a bowel movement and noticed bright red blood mixed with movement  She later developed some diffuse abdominal pain as well as nausea and an episode of vomiting  She said the pain was sharp and cramping  Upon presentation, white count was 10 3, she appears to have been afebrile  CT scan of the abdomen and pelvis revealed evidence of colitis extending from the area of hepatic flexure continuously to the rectosigmoid region  The patient says she only had 1 small bowel movement so far since presentation  Still has some mild abdominal discomfort but nothing worsening  She said that her symptoms started after a picnic; she does not recall eating anything unusual or undercooked though and does not think anybody else was sick since the picnic  She was here visiting from [place]  She denies any recent antibiotics use  She says her last colonoscopy was about 10 years ago and this was normal     REVIEW OF SYSTEMS:    CONSTITUTIONAL: Denies any fever, chills, or rigors  Good appetite, and no recent weight loss  HEENT: No earache or tinnitus  Denies hearing loss or visual disturbances  CARDIOVASCULAR: No chest pain or palpitations  RESPIRATORY: Denies any cough, hemoptysis, shortness of breath or dyspnea on exertion  GASTROINTESTINAL: As noted in the History of Present Illness  GENITOURINARY: No problems with urination  Denies any hematuria or dysuria  NEUROLOGIC: No dizziness or vertigo, denies headaches  MUSCULOSKELETAL: Denies any muscle or joint pain  SKIN: Denies skin rashes or itching  ENDOCRINE: Denies excessive thirst  Denies intolerance to heat or cold  PSYCHOSOCIAL: Denies depression or anxiety  Denies any recent memory loss  Historical Information   History reviewed  No pertinent past medical history  History reviewed  No pertinent surgical history  Social History   Social History     Substance and Sexual Activity   Alcohol Use Never    Frequency: Never     Social History     Substance and Sexual Activity   Drug Use Never     Social History     Tobacco Use   Smoking Status Never Smoker   Smokeless Tobacco Never Used     History reviewed  No pertinent family history  Meds/Allergies     No medications prior to admission  Current Facility-Administered Medications   Medication Dose Route Frequency    ciprofloxacin (CIPRO) IVPB (premix) 400 mg  400 mg Intravenous Q24H    heparin (porcine) subcutaneous injection 5,000 Units  5,000 Units Subcutaneous Q8H [address]    melatonin tablet 3 mg  3 mg Oral HS PRN    metroNIDAZOLE (FLAGYL) IVPB (premix) 500 mg  500 mg Intravenous Q8H    sodium chloride 0 9 % infusion  100 mL/hr Intravenous Continuous       No Known Allergies        Objective     Blood pressure 134/68, pulse 91, temperature 98 6 °F (37 °C), temperature source Oral, resp  rate 18, height 4' 11" (1 499 m), weight 54 6 kg (120 lb 5 9 oz), SpO2 96 %      No intake or output data in the 24 hours ending 08/14/19 1344      PHYSICAL EXAM     General Appearance:   Alert, cooperative, no distress, appears stated age    HEENT:   Normocephalic, atraumatic, anicteric      Neck:  Supple, symmetrical, trachea midline, no adenopathy;    thyroid: no enlargement/tenderness/nodules; no carotid  bruit or JVD    Lungs:   Clear to auscultation bilaterally; no rales, rhonchi or wheezing; respirations unlabored    Heart[de-identified]   S1 and S2 normal; regular rate and rhythm; no murmur, rub, or gallop     Abdomen:   Soft, non-tender, non-distended; normal bowel sounds; no masses, no organomegaly    Genitalia:   Deferred    Rectal:   Deferred    Extremities:  No cyanosis, clubbing or edema    Pulses:  2+ and symmetric all extremities    Skin:  Skin color, texture, turgor normal, no rashes or lesions    Lymph nodes:  No palpable cervical, axillary or inguinal lymphadenopathy        Lab Results:   Admission on 08/13/2019   Component Date Value    WBC 08/13/2019 10 30*    RBC 08/13/2019 4 31     Hemoglobin 08/13/2019 13 3     Hematocrit 08/13/2019 40 9     MCV 08/13/2019 95     MCH 08/13/2019 30 9     MCHC 08/13/2019 32 5     RDW 08/13/2019 13 0     MPV 08/13/2019 11 1     Platelets 72/77/2131 224     nRBC 08/13/2019 0     Neutrophils Relative 08/13/2019 91*    Immat GRANS % 08/13/2019 0     Lymphocytes Relative 08/13/2019 5*    Monocytes Relative 08/13/2019 4     Eosinophils Relative 08/13/2019 0     Basophils Relative 08/13/2019 0     Neutrophils Absolute 08/13/2019 9 29*    Immature Grans Absolute 08/13/2019 0 03     Lymphocytes Absolute 08/13/2019 0 55*    Monocytes Absolute 08/13/2019 0 41     Eosinophils Absolute 08/13/2019 0 00     Basophils Absolute 08/13/2019 0 02     Sodium 08/13/2019 136     Potassium 08/13/2019 5 5*    Chloride 08/13/2019 100     CO2 08/13/2019 29     ANION GAP 08/13/2019 7     BUN 08/13/2019 36*    Creatinine 08/13/2019 1 85*    Glucose 08/13/2019 131     Calcium 08/13/2019 9 9     AST 08/13/2019 23     ALT 08/13/2019 25     Alkaline Phosphatase 08/13/2019 75     Total Protein 08/13/2019 7 3     Albumin 08/13/2019 3 7     Total Bilirubin 08/13/2019 0 90     eGFR 08/13/2019 27     Protime 08/13/2019 13 0     INR 08/13/2019 1 04     PTT 08/13/2019 27     Lipase 08/13/2019 183     TSH 3RD GENERATON 08/13/2019 6 076*    Sodium 08/14/2019 141     Potassium 08/14/2019 4 6     Chloride 08/14/2019 107     CO2 08/14/2019 26     ANION GAP 08/14/2019 8     BUN 08/14/2019 30*    Creatinine 08/14/2019 1 34*    Glucose 08/14/2019 110     Calcium 08/14/2019 8 5     AST 08/14/2019 24     ALT 08/14/2019 24     Alkaline Phosphatase 08/14/2019 57     Total Protein 08/14/2019 5 7*    Albumin 08/14/2019 2 9*    Total Bilirubin 08/14/2019 0 60     eGFR 08/14/2019 40     WBC 08/14/2019 7 67     RBC 08/14/2019 3 57*    Hemoglobin 08/14/2019 10 8*    Hematocrit 08/14/2019 33 9*    MCV 08/14/2019 95     MCH 08/14/2019 30 3     MCHC 08/14/2019 31 9     RDW 08/14/2019 13 2     Platelets 78/00/3486 172     MPV 08/14/2019 10 9        Imaging Studies: I have personally reviewed pertinent reports  CT ABDOMEN AND PELVIS WITHOUT IV CONTRAST     INDICATION:   pain  Rectal bleeding  Vomiting  Diarrhea      COMPARISON:  None      TECHNIQUE:  CT examination of the abdomen and pelvis was performed without intravenous contrast   Axial, sagittal, and coronal 2D reformatted images were created from the source data and submitted for interpretation       Radiation dose length product (DLP) for this visit:  286 mGy-cm   This examination, like all CT scans performed in the [facility], was performed utilizing techniques to minimize radiation dose exposure, including the use of iterative   reconstruction and automated exposure control       Enteric contrast was not administered in accordance with physician request       FINDINGS:     ABDOMEN     LOWER CHEST:  No clinically significant abnormality identified in the visualized lower chest      LIVER/BILIARY TREE:  Unremarkable      GALLBLADDER:  No calcified gallstones  No pericholecystic inflammatory change      SPLEEN:  Unremarkable      PANCREAS:  Unremarkable      ADRENAL GLANDS:  Unremarkable      KIDNEYS/URETERS:  Unremarkable   No hydronephrosis      STOMACH AND BOWEL:  Evaluation of bowel is somewhat limited as a result of absent intravenous and enteric contrast   However, there is apparent bowel wall thickening involving a long segment of large intestine from hepatic flecture through rectosigmoid   colon in a pattern most consistent with colitis  There is mild pericolonic inflammatory edema in the region of the splenic flecture  No pericolonic abscess  No free intraperitoneal gas  No bowel obstruction  Unremarkable appearance of stomach and   small intestine      APPENDIX:  No findings to suggest appendicitis      ABDOMINOPELVIC CAVITY:  No ascites or free intraperitoneal air  No lymphadenopathy      VESSELS:  Unremarkable for patient's age      PELVIS     REPRODUCTIVE ORGANS:  Unremarkable for patient's age      URINARY BLADDER:  Unremarkable      ABDOMINAL WALL/INGUINAL REGIONS:  Unremarkable      OSSEOUS STRUCTURES:  No acute fracture or destructive osseous lesion  Thoracolumbar scoliotic deformity with multilevel degenerative changes noted  Fusion of posterior elements is noted throughout the lower lumbar spine      IMPRESSION:     Findings most consistent with acute colitis with most likely diagnostic considerations including infectious colitis or inflammatory bowel disease           ASSESSMENT/PLAN:     1  Acute onset of abdominal pain, diarrhea and CT scan showing evidence of colitis from the hepatic flexure extending into the rectosigmoid region  Clinically appears most consistent with an infectious colitis, viral versus bacterial, and uncomplicated at this time      - check stool enteric panel, leukocytes, C diff  - agree with bowel rest, clear liquid diet for now, gradually advance to low residue diet as patient tolerates, would recommend lactose restriction in the meantime  - reasonable to continue empiric antibiotics for now but may consider discontinuing if stool tests are negative, patient persist without fevers or significant leukocytosis, etc  - monitor abdominal exam, temperature, white blood cell count  - she should have colonoscopy in several weeks as an outpatient, she says that she has not had one done for at least 10 years    The patient was seen and examined by Dr Santo Menezes, all donaldson medical decisions were made with Dr Santo Menezes  Thank you for allowing us to participate in the care of this pleasant patient  We will follow up with you closely

## 2019-08-14 NOTE — UTILIZATION REVIEW
Initial Clinical Review    Admission: Date/Time/Statement: 8/13/2019  1653 OBSERVATION AND CHANGED 8/14/2019  1328 INPATIENT RE:   Has failed observation with continued rectal bleeding secondary to colitis with down grade of diet to clears and GI consult  Start   Ordered   08/14/19 1329  Inpatient Admission Once     Transfer Service: General Medicine    Expected Discharge Date: 08/16/19       Question Answer Comment   Admitting Physician Jovi Blum    Level of Care Med Surg    Estimated length of stay More than 2 Midnights    Certification I certify that inpatient services are medically necessary for this patient for a duration of greater than two midnights  See H&P and MD Progress Notes for additional information about the patient's course of treatment  08/14/19 1328       ED Arrival Information     Expected Arrival Acuity Means of Arrival Escorted By Service Admission Type    - 8/13/2019 11:02 Urgent Walk-In Family Member Hospitalist Urgent    Arrival Complaint    rectal bleeding        Chief Complaint   Patient presents with    Rectal Bleeding     patient reports rectal bleeding and vomiting last night  thinks it started with a bowel movement  reports currently bleeding w/ pad on  daughter thinks she might have a fever from last night  Assessment/Plan: This is a 72year old female who presents with diarrhea for 2 days and HERB     Colitis  Will check enteric panel and c/w abx for now   Can likely DC in 24 hours if no temp or WCC  Will check C-diff     Also will give IVF and recheck BMP in am    ED Triage Vitals   Temperature Pulse Respirations Blood Pressure SpO2   08/13/19 1121 08/13/19 1121 08/13/19 1121 08/13/19 1121 08/13/19 1121   98 3 °F (36 8 °C) 95 18 123/69 98 %      Temp Source Heart Rate Source Patient Position - Orthostatic VS BP Location FiO2 (%)   08/13/19 1121 08/13/19 1121 08/13/19 1121 08/13/19 1121 --   Oral Monitor Sitting Left arm       Pain Score       08/13/19 5874 No Pain        Wt Readings from Last 1 Encounters:   08/14/19 54 6 kg (120 lb 5 9 oz)     Additional Vital Signs:   08/14/19 0627  98 6 °F (37 °C)  91  18  134/68  96 %  None (Room air)  Lying   08/13/19 2300  98 8 °F (37 1 °C)  84  18  128/70  95 %  None (Room air)  Lying   08/13/19 1739  99 2 °F (37 3 °C)  89  18  132/63  96 %  None (Room air)  --   08/13/19 1701  --  99  18  101/71  97 %  None (Room air)  Sitting   08/13/19 1523  --  97  18  115/51  95 %  None (Room air)         Pertinent Labs/Diagnostic Test Results:   8/13/201i9 Ct abdomen - Findings most consistent with acute colitis with most likely diagnostic considerations including infectious colitis or inflammatory bowel disease  Results from last 7 days   Lab Units 08/14/19  0545 08/13/19  1244   WBC Thousand/uL 7 67 10 30*   HEMOGLOBIN g/dL 10 8* 13 3   HEMATOCRIT % 33 9* 40 9   PLATELETS Thousands/uL 172 224   NEUTROS ABS Thousands/µL  --  9 29*     Results from last 7 days   Lab Units 08/14/19  0545 08/13/19  1244   SODIUM mmol/L 141 136   POTASSIUM mmol/L 4 6 5 5*   CHLORIDE mmol/L 107 100   CO2 mmol/L 26 29   ANION GAP mmol/L 8 7   BUN mg/dL 30* 36*   CREATININE mg/dL 1 34* 1 85*   EGFR ml/min/1 73sq m 40 27   CALCIUM mg/dL 8 5 9 9     Results from last 7 days   Lab Units 08/14/19  0545 08/13/19  1244   AST U/L 24 23   ALT U/L 24 25   ALK PHOS U/L 57 75   TOTAL PROTEIN g/dL 5 7* 7 3   ALBUMIN g/dL 2 9* 3 7   TOTAL BILIRUBIN mg/dL 0 60 0 90     Results from last 7 days   Lab Units 08/14/19  0545 08/13/19  1244   GLUCOSE RANDOM mg/dL 110 131     Results from last 7 days   Lab Units 08/13/19  1300   PROTIME seconds 13 0   INR  1 04   PTT seconds 27     Results from last 7 days   Lab Units 08/13/19  1300   TSH 3RD GENERATON uIU/mL 6 076*     Results from last 7 days   Lab Units 08/13/19  1300   LIPASE u/L 183       ED Treatment:   Medication Administration from 08/13/2019 1102 to 08/13/2019 1887       Date/Time Order Dose Route Action Comments 08/13/2019 1702 metroNIDAZOLE (FLAGYL) IVPB (premix) 500 mg 500 mg Intravenous New Bag verbal per MAISHA Lucia to start abx at this time  History reviewed  No pertinent past medical history  Present on Admission:  **None**      Admitting Diagnosis: Colitis [K52 9]  Rectal bleeding [K62 5]  Age/Sex: 72 y o  female  Admission Orders:  8/13/2019  1653 OBSERVATION AND CHANGED INPATIENT 8/14/2019  1328     Current Facility-Administered Medications:  ciprofloxacin 400 mg Intravenous Q24H    heparin (porcine) 5,000 Units Subcutaneous Q8H [facility]    melatonin 3 mg Oral HS PRN    metroNIDAZOLE 500 mg Intravenous Q8H Last Rate: 500 mg (08/14/19 0004)   sodium chloride 100 mL/hr Intravenous Continuous Last Rate: 100 mL/hr (08/14/19 0544)         Network Utilization Review Department  Phone: [phone]; Fax [phone]  [email or web address]  ATTENTION: Please call with any questions or concerns to [phone]  and carefully listen to the prompts so that you are directed to the right person  Send all requests for admission clinical reviews, approved or denied determinations and any other requests to fax [phone]   All voicemails are confidential

## 2019-08-14 NOTE — PLAN OF CARE
Problem: Potential for Falls  Goal: Patient will remain free of falls  Description  INTERVENTIONS:  - Assess patient frequently for physical needs  -  Identify cognitive and physical deficits and behaviors that affect risk of falls    -  [place] fall precautions as indicated by assessment   - Educate patient/family on patient safety including physical limitations  - Instruct patient to call for assistance with activity based on assessment  - Modify environment to reduce risk of injury  - Consider OT/PT consult to assist with strengthening/mobility  Outcome: Progressing     Problem: PAIN - ADULT  Goal: Verbalizes/displays adequate comfort level or baseline comfort level  Description  Interventions:  - Encourage patient to monitor pain and request assistance  - Assess pain using appropriate pain scale  - Administer analgesics based on type and severity of pain and evaluate response  - Implement non-pharmacological measures as appropriate and evaluate response  - Consider cultural and social influences on pain and pain management  - Notify physician/advanced practitioner if interventions unsuccessful or patient reports new pain  Outcome: Progressing     Problem: INFECTION - ADULT  Goal: Absence or prevention of progression during hospitalization  Description  INTERVENTIONS:  - Assess and monitor for signs and symptoms of infection  - Monitor lab/diagnostic results  - Monitor all insertion sites, i e  indwelling lines, tubes, and drains  - Monitor endotracheal if appropriate and nasal secretions for changes in amount and color  - [place] appropriate cooling/warming therapies per order  - Administer medications as ordered  - Instruct and encourage patient and family to use good hand hygiene technique  - Identify and instruct in appropriate isolation precautions for identified infection/condition  Outcome: Progressing  Goal: Absence of fever/infection during neutropenic period  Description  INTERVENTIONS:  - Monitor WBC    Outcome: Progressing     Problem: SAFETY ADULT  Goal: Patient will remain free of falls  Description  INTERVENTIONS:  - Assess patient frequently for physical needs  -  Identify cognitive and physical deficits and behaviors that affect risk of falls    -  [place] fall precautions as indicated by assessment   - Educate patient/family on patient safety including physical limitations  - Instruct patient to call for assistance with activity based on assessment  - Modify environment to reduce risk of injury  - Consider OT/PT consult to assist with strengthening/mobility  Outcome: Progressing  Goal: Maintain or return to baseline ADL function  Description  INTERVENTIONS:  -  Assess patient's ability to carry out ADLs; assess patient's baseline for ADL function and identify physical deficits which impact ability to perform ADLs (bathing, care of mouth/teeth, toileting, grooming, dressing, etc )  - Assess/evaluate cause of self-care deficits   - Assess range of motion  - Assess patient's mobility; develop plan if impaired  - Assess patient's need for assistive devices and provide as appropriate  - Encourage maximum independence but intervene and supervise when necessary  - Involve family in performance of ADLs  - Assess for home care needs following discharge   - Consider OT consult to assist with ADL evaluation and planning for discharge  - Provide patient education as appropriate  Outcome: Progressing  Goal: Maintain or return mobility status to optimal level  Description  INTERVENTIONS:  - Assess patient's baseline mobility status (ambulation, transfers, stairs, etc )    - Identify cognitive and physical deficits and behaviors that affect mobility  - Identify mobility aids required to assist with transfers and/or ambulation (gait belt, sit-to-stand, lift, walker, cane, etc )  - [place] fall precautions as indicated by assessment  - Record patient progress and toleration of activity level on Mobility SBAR; progress patient to next Phase/Stage  - Instruct patient to call for assistance with activity based on assessment  - Consider rehabilitation consult to assist with strengthening/weightbearing, etc   Outcome: Progressing     Problem: DISCHARGE PLANNING  Goal: Discharge to home or other facility with appropriate resources  Description  INTERVENTIONS:  - Identify barriers to discharge w/patient and caregiver  - Arrange for needed discharge resources and transportation as appropriate  - Identify discharge learning needs (meds, wound care, etc )  - Arrange for interpretive services to assist at discharge as needed  - Refer to Case Management Department for coordinating discharge planning if the patient needs post-hospital services based on physician/advanced practitioner order or complex needs related to functional status, cognitive ability, or social support system  Outcome: Progressing     Problem: Knowledge Deficit  Goal: Patient/family/caregiver demonstrates understanding of disease process, treatment plan, medications, and discharge instructions  Description  Complete learning assessment and assess knowledge base    Interventions:  - Provide teaching at level of understanding  - Provide teaching via preferred learning methods  Outcome: Progressing

## 2019-08-14 NOTE — PROGRESS NOTES
Progress Note - Sissy Severe 1945, 72 y o  female MRN: [id number]    Unit/Bed#: -01 Encounter: [id number]    Primary Care Provider: No primary care provider on file  Date and time admitted to hospital: 8/13/2019 12:07 PM        * Colitis  Assessment & Plan  -multiple episodes of bloody diarrhea with abdominal pain  -CT-abd: positive for Acute Colitis  -con't IV Ciprofloxacin 200mg q24 and Flagyl IV 500mg q8  -con't IVF  -pending C  Diff toxin and Stool Enteric Bacterial panel  -likely discharge this afternoon if no further bleeding after lunch, will monitor   -follow up out patient with GI upon discharge  HERB (acute kidney injury) ([facility] [address] )  Assessment & Plan  -Cr 1 85; likely due to volume loss from diarrhea and vomitting --> Resolving  -con't IVF, monitor Cr daily  -avoid nephrotoxins; hold Lisinopril    Hyperkalemia  Assessment & Plan  -K 5 5 on admission --> Resolved, now 4 6  -con't IVF  -monitor      History of hypertension  Assessment & Plan  -BP stable, con't to monitor  -hold lisinopril due to HERB    History of hypothyroidism  Assessment & Plan  -TSH: 6 076  -con't home meds (levothyroxine)    History of Overactive bladder  Assessment & Plan  -hold home med, Myrbetriq  -con't upon discharge        VTE Pharmacologic Prophylaxis:   Pharmacologic: Heparin  Mechanical VTE Prophylaxis in Place: Yes    Patient Centered Rounds: I have performed bedside rounds with nursing staff today  Discussions with Specialists or Other Care Team Provider: Yes    Education and Discussions with Family / Patient: Yes    Time Spent for Care: 45 minutes  More than 50% of total time spent on counseling and coordination of care as described above      Current Length of Stay: 0 day(s)    Current Patient Status: Observation   Certification Statement: pt will be discharged this afternoon if no further rectal bleeding after lunch,    Discharge Plan: discharge this afternoon if no further rectal bleeding after lunch     Code Status: Level 1 - Full Code      Subjective:   Pt states she had 3 episodes of rectal bleeding overnight  This morning, she is comfortable and denies any weakness, pain or further complaints at this time  She is tolerating a regular diet  Objective:     Vitals:   Temp (24hrs), Av 9 °F (37 2 °C), Min:98 6 °F (37 °C), Max:99 2 °F (37 3 °C)    Temp:  [98 6 °F (37 °C)-99 2 °F (37 3 °C)] 98 6 °F (37 °C)  HR:  [84-99] 91  Resp:  [18] 18  BP: (101-177)/(51-72) 134/68  SpO2:  [95 %-97 %] 96 %  Body mass index is 24 31 kg/m²  Input and Output Summary (last 24 hours):     No intake or output data in the 24 hours ending 19 1126    Physical Exam:     Physical Exam   Constitutional: She is oriented to person, place, and time  She appears well-developed and well-nourished  HENT:   Head: Normocephalic and atraumatic  Eyes: Conjunctivae and EOM are normal    Neck: Normal range of motion  Neck supple  Cardiovascular: Normal rate, regular rhythm and normal heart sounds  Pulmonary/Chest: Effort normal and breath sounds normal    Abdominal: Soft  Bowel sounds are normal  She exhibits no distension  There is no tenderness  There is no rebound and no guarding  Musculoskeletal: Normal range of motion  Neurological: She is alert and oriented to person, place, and time  Skin: Skin is warm and dry  Capillary refill takes less than 2 seconds  Psychiatric: She has a normal mood and affect          Additional Data:     Labs:    Results from last 7 days   Lab Units 19  0545 19  1244   WBC Thousand/uL 7 67 10 30*   HEMOGLOBIN g/dL 10 8* 13 3   HEMATOCRIT % 33 9* 40 9   PLATELETS Thousands/uL 172 224   NEUTROS PCT %  --  91*   LYMPHS PCT %  --  5*   MONOS PCT %  --  4   EOS PCT %  --  0     Results from last 7 days   Lab Units 19  0545   SODIUM mmol/L 141   POTASSIUM mmol/L 4 6   CHLORIDE mmol/L 107   CO2 mmol/L 26   BUN mg/dL 30*   CREATININE mg/dL 1 34*   ANION GAP mmol/L 8 CALCIUM mg/dL 8 5   ALBUMIN g/dL 2 9*   TOTAL BILIRUBIN mg/dL 0 60   ALK PHOS U/L 57   ALT U/L 24   AST U/L 24   GLUCOSE RANDOM mg/dL 110     Results from last 7 days   Lab Units 08/13/19  1300   INR  1 04                       * I Have Reviewed All Lab Data Listed Above  * Additional Pertinent Lab Tests Reviewed: [facility] [address] Admission Reviewed      Recent Cultures (last 7 days):           Last 24 Hours Medication List:     Current Facility-Administered Medications:  ciprofloxacin 400 mg Intravenous Q24H Tuyet Sandoval MD    heparin (porcine) 5,000 Units Subcutaneous [facility] hospital Tuyet Sandoval MD    melatonin 3 mg Oral HS PRN Michelle Arguelles PA-C    metroNIDAZOLE 500 mg Intravenous Q8H Tuyet Sandoval MD Last Rate: 500 mg (08/14/19 0901)   sodium chloride 100 mL/hr Intravenous Continuous Tuyet Sandoval MD Last Rate: 100 mL/hr (08/14/19 0544)        Today, Patient Was Seen By: Elena Shen MD    ** Please Note: Dictation voice to text software may have been used in the creation of this document   **

## 2019-08-14 NOTE — ASSESSMENT & PLAN NOTE
-multiple episodes of bloody diarrhea with abdominal pain  -CT-abd: positive for Acute Colitis  -con't IV Ciprofloxacin 200mg q24 and Flagyl IV 500mg q8  -con't IVF  -pending C  Diff toxin and Stool Enteric Bacterial panel  -likely discharge this afternoon if no further bleeding after lunch, will monitor   -follow up out patient with GI upon discharge

## 2019-08-15 LAB
ANION GAP SERPL CALCULATED.3IONS-SCNC: 11 MMOL/L (ref 4–13)
BASOPHILS # BLD AUTO: 0.06 THOUSANDS/ΜL (ref 0–0.1)
BASOPHILS NFR BLD AUTO: 1 % (ref 0–1)
BUN SERPL-MCNC: 17 MG/DL (ref 5–25)
C DIFF TOX GENS STL QL NAA+PROBE: NORMAL
CALCIUM SERPL-MCNC: 7.9 MG/DL (ref 8.3–10.1)
CAMPYLOBACTER DNA SPEC NAA+PROBE: NORMAL
CHLORIDE SERPL-SCNC: 111 MMOL/L (ref 100–108)
CO2 SERPL-SCNC: 20 MMOL/L (ref 21–32)
CREAT SERPL-MCNC: 0.92 MG/DL (ref 0.6–1.3)
EOSINOPHIL # BLD AUTO: 0.18 THOUSAND/ΜL (ref 0–0.61)
EOSINOPHIL NFR BLD AUTO: 4 % (ref 0–6)
ERYTHROCYTE [DISTWIDTH] IN BLOOD BY AUTOMATED COUNT: 13.5 % (ref 11.6–15.1)
GFR SERPL CREATININE-BSD FRML MDRD: 63 ML/MIN/1.73SQ M
GLUCOSE SERPL-MCNC: 98 MG/DL (ref 65–140)
HCT VFR BLD AUTO: 32.6 % (ref 34.8–46.1)
HGB BLD-MCNC: 10.4 G/DL (ref 11.5–15.4)
IMM GRANULOCYTES # BLD AUTO: 0.02 THOUSAND/UL (ref 0–0.2)
IMM GRANULOCYTES NFR BLD AUTO: 0 % (ref 0–2)
LYMPHOCYTES # BLD AUTO: 1.23 THOUSANDS/ΜL (ref 0.6–4.47)
LYMPHOCYTES NFR BLD AUTO: 25 % (ref 14–44)
MCH RBC QN AUTO: 30.9 PG (ref 26.8–34.3)
MCHC RBC AUTO-ENTMCNC: 31.9 G/DL (ref 31.4–37.4)
MCV RBC AUTO: 97 FL (ref 82–98)
MONOCYTES # BLD AUTO: 0.45 THOUSAND/ΜL (ref 0.17–1.22)
MONOCYTES NFR BLD AUTO: 9 % (ref 4–12)
NEUTROPHILS # BLD AUTO: 2.9 THOUSANDS/ΜL (ref 1.85–7.62)
NEUTS SEG NFR BLD AUTO: 61 % (ref 43–75)
NRBC BLD AUTO-RTO: 0 /100 WBCS
PLATELET # BLD AUTO: 166 THOUSANDS/UL (ref 149–390)
PMV BLD AUTO: 10.9 FL (ref 8.9–12.7)
POTASSIUM SERPL-SCNC: 4.5 MMOL/L (ref 3.5–5.3)
RBC # BLD AUTO: 3.37 MILLION/UL (ref 3.81–5.12)
SALMONELLA DNA SPEC QL NAA+PROBE: NORMAL
SHIGA TOXIN STX GENE SPEC NAA+PROBE: NORMAL
SHIGELLA DNA SPEC QL NAA+PROBE: NORMAL
SODIUM SERPL-SCNC: 142 MMOL/L (ref 136–145)
WBC # BLD AUTO: 4.84 THOUSAND/UL (ref 4.31–10.16)

## 2019-08-15 PROCEDURE — 99232 SBSQ HOSP IP/OBS MODERATE 35: CPT | Performed by: INTERNAL MEDICINE

## 2019-08-15 PROCEDURE — 85025 COMPLETE CBC W/AUTO DIFF WBC: CPT | Performed by: PHYSICIAN ASSISTANT

## 2019-08-15 PROCEDURE — 80048 BASIC METABOLIC PNL TOTAL CA: CPT | Performed by: PHYSICIAN ASSISTANT

## 2019-08-15 RX ADMIN — HEPARIN SODIUM 5000 UNITS: 5000 INJECTION INTRAVENOUS; SUBCUTANEOUS at 22:14

## 2019-08-15 RX ADMIN — CIPROFLOXACIN 400 MG: 2 INJECTION, SOLUTION INTRAVENOUS at 17:15

## 2019-08-15 RX ADMIN — SODIUM CHLORIDE 100 ML/HR: 0.9 INJECTION, SOLUTION INTRAVENOUS at 03:24

## 2019-08-15 RX ADMIN — HEPARIN SODIUM 5000 UNITS: 5000 INJECTION INTRAVENOUS; SUBCUTANEOUS at 13:07

## 2019-08-15 RX ADMIN — METRONIDAZOLE 500 MG: 500 INJECTION, SOLUTION INTRAVENOUS at 16:02

## 2019-08-15 RX ADMIN — METRONIDAZOLE 500 MG: 500 INJECTION, SOLUTION INTRAVENOUS at 23:13

## 2019-08-15 RX ADMIN — METRONIDAZOLE 500 MG: 500 INJECTION, SOLUTION INTRAVENOUS at 07:51

## 2019-08-15 RX ADMIN — MELATONIN 3 MG: at 22:14

## 2019-08-15 RX ADMIN — SODIUM CHLORIDE 100 ML/HR: 0.9 INJECTION, SOLUTION INTRAVENOUS at 23:13

## 2019-08-15 NOTE — PROGRESS NOTES
Progress Note - Lauren Mcwilliams 1945, 72 y o  female MRN: [id number]    Unit/Bed#: -01 Encounter: [id number]    Primary Care Provider: No primary care provider on file  Date and time admitted to hospital: 8/13/2019 12:07 PM        * Acute Colitis  Assessment & Plan  -On admission, chief complaint of multiple episodes of bloody diarrhea with abdominal pain --> resolving  -CT-abd: evidence of Acute Colitis from the hepatic flexure extending into the rectosigmoid region   -con't IV Ciprofloxacin 200mg q24 and Flagyl IV 500mg q8   -con't IVF  -C  Diff toxin and Stool Enteric Bacterial panel negative  -GI consulted for continued rectal bleeding (now improving); advance to Full liquid diet with toast and crackers, con't to monitor with abdominal exams, vitals, daily BMP & CBC   -follow up with out patient GI for colonoscopy in 4-6 weeks    HERB (acute kidney injury) ([facility] [address] )  Assessment & Plan  -Cr 1 85; likely due to volume loss from diarrhea and vomitting --> Resolved  -con't IVF, monitor Cr daily  -avoid nephrotoxins; hold Lisinopril    Hyperkalemia  Assessment & Plan  -K 5 5 on admission --> Resolved, now 4 5  -con't IVF  -monitor      History of hypertension  Assessment & Plan  -BP stable, con't to monitor  -hold lisinopril due to HERB    History of hypothyroidism  Assessment & Plan  -TSH: 6 076  -con't home meds (levothyroxine)    History of Overactive bladder  Assessment & Plan  -hold home med, Myrbetriq  -con't upon discharge        VTE Pharmacologic Prophylaxis:   Pharmacologic: Heparin  Mechanical VTE Prophylaxis in Place: Yes    Patient Centered Rounds: I have performed bedside rounds with nursing staff today  Discussions with Specialists or Other Care Team Provider: Yes, discussed patient's diet with GI, we will see how she tolerates her FLD with toast & crackers, and will advance as needed      Education and Discussions with Family / Patient: Yes, spoke with Chery Mariano (pt's friend)    Time Spent for Care: 45 minutes  More than 50% of total time spent on counseling and coordination of care as described above  Current Length of Stay: 1 day(s)    Current Patient Status: Inpatient   Certification Statement: The patient will continue to require additional inpatient hospital stay due to IV fluids and IV antibiotics for colitis    Discharge Plan: 1-2 days    Code Status: Level 1 - Full Code      Subjective:   Pt  Is tolerating her diet and states she had a BM this am with very minimal blood noted  She is no longer having the profuse rectal bleeding  Pt denies any nausea/vomitting, fevers or further complaints at this time  Objective:   Pt is awake, alert and comfortable  No acute overnight events  Vitals:   Temp (24hrs), Av 7 °F (36 5 °C), Min:97 5 °F (36 4 °C), Max:97 9 °F (36 6 °C)    Temp:  [97 5 °F (36 4 °C)-97 9 °F (36 6 °C)] 97 5 °F (36 4 °C)  HR:  [76-91] 76  Resp:  [16-18] 18  BP: (110-147)/(58-69) 147/69  SpO2:  [94 %-97 %] 97 %  Body mass index is 25 53 kg/m²  Input and Output Summary (last 24 hours): Intake/Output Summary (Last 24 hours) at 8/15/2019 1214  Last data filed at 8/15/2019 2166  Gross per 24 hour   Intake 1000 ml   Output 1200 ml   Net -200 ml       Physical Exam:     Physical Exam   Constitutional: She is oriented to person, place, and time  She appears well-developed and well-nourished  HENT:   Head: Normocephalic and atraumatic  Eyes: Pupils are equal, round, and reactive to light  Conjunctivae and EOM are normal    Neck: Normal range of motion  Neck supple  Cardiovascular: Normal rate, regular rhythm and normal heart sounds  Pulmonary/Chest: Effort normal and breath sounds normal    Abdominal: Soft  Bowel sounds are normal    Minimal epigastric tenderness with palpation   Musculoskeletal: Normal range of motion  Neurological: She is alert and oriented to person, place, and time  Skin: Skin is warm and dry  Capillary refill takes less than 2 seconds  Psychiatric: She has a normal mood and affect  Additional Data:     Labs:    Results from last 7 days   Lab Units 08/15/19  1022   WBC Thousand/uL 4 84   HEMOGLOBIN g/dL 10 4*   HEMATOCRIT % 32 6*   PLATELETS Thousands/uL 166   NEUTROS PCT % 61   LYMPHS PCT % 25   MONOS PCT % 9   EOS PCT % 4     Results from last 7 days   Lab Units 08/15/19  1022 08/14/19  0545   SODIUM mmol/L 142 141   POTASSIUM mmol/L 4 5 4 6   CHLORIDE mmol/L 111* 107   CO2 mmol/L 20* 26   BUN mg/dL 17 30*   CREATININE mg/dL 0 92 1 34*   ANION GAP mmol/L 11 8   CALCIUM mg/dL 7 9* 8 5   ALBUMIN g/dL  --  2 9*   TOTAL BILIRUBIN mg/dL  --  0 60   ALK PHOS U/L  --  57   ALT U/L  --  24   AST U/L  --  24   GLUCOSE RANDOM mg/dL 98 110     Results from last 7 days   Lab Units 08/13/19  1300   INR  1 04                       * I Have Reviewed All Lab Data Listed Above  * Additional Pertinent Lab Tests Reviewed: [address] Admission Reviewed      Recent Cultures (last 7 days):     Results from last 7 days   Lab Units 08/14/19 2046   C DIFF TOXIN B  NEGATIVE for C difficle toxin by PCR  Last 24 Hours Medication List:     Current Facility-Administered Medications:  ciprofloxacin 400 mg Intravenous Q24H Roberto Perez MD Last Rate: 400 mg (08/14/19 3285)   heparin (porcine) 5,000 Units Subcutaneous Critical access hospital Roberto Perez MD    melatonin 3 mg Oral HS PRN Dalton Madrid PA-C    metroNIDAZOLE 500 mg Intravenous Q8H Roberto Perez MD Last Rate: 500 mg (08/15/19 5394)   sodium chloride 100 mL/hr Intravenous Continuous Roberto Perez MD Last Rate: 100 mL/hr (08/15/19 9464)        Today, Patient Was Seen By: Madeleine Valdovinos MD    ** Please Note: Dictation voice to text software may have been used in the creation of this document   **

## 2019-08-15 NOTE — PROGRESS NOTES
Progress Note - Sailaja Pino 72 y o  female MRN: [id number]    Unit/Bed#: -[place] Encounter: [id number]        Subjective:   Patient says she had 1 small bowel movement this morning which was loose and mixed with small amount of bright red blood, she says this was the 1st bowel movement she has had since coming up to the floor  Admits to mild lower abdominal discomfort at this time which is not worse since yesterday  No subjective fevers or chills  Objective:     Vitals: Blood pressure 147/69, pulse 76, temperature 97 5 °F (36 4 °C), temperature source Oral, resp  rate 18, height 4' 11" (1 499 m), weight 57 3 kg (126 lb 6 4 oz), SpO2 97 %  ,Body mass index is 25 53 kg/m²  Intake/Output Summary (Last 24 hours) at 8/15/2019 1137  Last data filed at 8/15/2019 4913  Gross per 24 hour   Intake 1000 ml   Output 1200 ml   Net -200 ml       Physical Exam:   General appearance: alert, appears stated age and cooperative  Lungs: clear to auscultation bilaterally, no labored breathing/accessory muscle use  Heart: regular rate and rhythm, S1, S2 normal, no murmur, click, rub or gallop  Abdomen: soft, mild periumbilical and lower abdominal tenderness with no guarding; bowel sounds normal; no masses,  no organomegaly  Extremities: no edema    Invasive Devices     Peripheral Intravenous Line            Peripheral IV 08/13/19 Left;Ventral (anterior) Forearm 2 days                Lab, Imaging and other studies: I have personally reviewed pertinent reports      Admission on 08/13/2019   Component Date Value    WBC 08/13/2019 10 30*    RBC 08/13/2019 4 31     Hemoglobin 08/13/2019 13 3     Hematocrit 08/13/2019 40 9     MCV 08/13/2019 95     MCH 08/13/2019 30 9     MCHC 08/13/2019 32 5     RDW 08/13/2019 13 0     MPV 08/13/2019 11 1     Platelets 17/56/8563 224     nRBC 08/13/2019 0     Neutrophils Relative 08/13/2019 91*    Immat GRANS % 08/13/2019 0     Lymphocytes Relative 08/13/2019 5*    Monocytes Relative 08/13/2019 4     Eosinophils Relative 08/13/2019 0     Basophils Relative 08/13/2019 0     Neutrophils Absolute 08/13/2019 9 29*    Immature Grans Absolute 08/13/2019 0 03     Lymphocytes Absolute 08/13/2019 0 55*    Monocytes Absolute 08/13/2019 0 41     Eosinophils Absolute 08/13/2019 0 00     Basophils Absolute 08/13/2019 0 02     Sodium 08/13/2019 136     Potassium 08/13/2019 5 5*    Chloride 08/13/2019 100     CO2 08/13/2019 29     ANION GAP 08/13/2019 7     BUN 08/13/2019 36*    Creatinine 08/13/2019 1 85*    Glucose 08/13/2019 131     Calcium 08/13/2019 9 9     AST 08/13/2019 23     ALT 08/13/2019 25     Alkaline Phosphatase 08/13/2019 75     Total Protein 08/13/2019 7 3     Albumin 08/13/2019 3 7     Total Bilirubin 08/13/2019 0 90     eGFR 08/13/2019 27     Protime 08/13/2019 13 0     INR 08/13/2019 1 04     PTT 08/13/2019 27     Lipase 08/13/2019 183     Salmonella sp PCR 08/14/2019 None Detected     Shigella sp/Enteroinvasi* 08/14/2019 None Detected     Campylobacter sp (jejuni* 08/14/2019 None Detected     Shiga toxin 1/Shiga toxi* 08/14/2019 None Detected     C difficile toxin by PCR 08/14/2019 NEGATIVE for C difficle toxin by PCR        TSH 3RD GENERATON 08/13/2019 6 076*    Sodium 08/14/2019 141     Potassium 08/14/2019 4 6     Chloride 08/14/2019 107     CO2 08/14/2019 26     ANION GAP 08/14/2019 8     BUN 08/14/2019 30*    Creatinine 08/14/2019 1 34*    Glucose 08/14/2019 110     Calcium 08/14/2019 8 5     AST 08/14/2019 24     ALT 08/14/2019 24     Alkaline Phosphatase 08/14/2019 57     Total Protein 08/14/2019 5 7*    Albumin 08/14/2019 2 9*    Total Bilirubin 08/14/2019 0 60     eGFR 08/14/2019 40     WBC 08/14/2019 7 67     RBC 08/14/2019 3 57*    Hemoglobin 08/14/2019 10 8*    Hematocrit 08/14/2019 33 9*    MCV 08/14/2019 95     MCH 08/14/2019 30 3     MCHC 08/14/2019 31 9     RDW 08/14/2019 13 2     Platelets 37/17/9384 172  MPV 08/14/2019 10 9     Hemoglobin 08/14/2019 10 5*    Hematocrit 08/14/2019 32 6*    WBC 08/15/2019 4 84     RBC 08/15/2019 3 37*    Hemoglobin 08/15/2019 10 4*    Hematocrit 08/15/2019 32 6*    MCV 08/15/2019 97     MCH 08/15/2019 30 9     MCHC 08/15/2019 31 9     RDW 08/15/2019 13 5     MPV 08/15/2019 10 9     Platelets 73/58/4065 166     nRBC 08/15/2019 0     Neutrophils Relative 08/15/2019 61     Immat GRANS % 08/15/2019 0     Lymphocytes Relative 08/15/2019 25     Monocytes Relative 08/15/2019 9     Eosinophils Relative 08/15/2019 4     Basophils Relative 08/15/2019 1     Neutrophils Absolute 08/15/2019 2 90     Immature Grans Absolute 08/15/2019 0 02     Lymphocytes Absolute 08/15/2019 1 23     Monocytes Absolute 08/15/2019 0 45     Eosinophils Absolute 08/15/2019 0 18     Basophils Absolute 08/15/2019 0 06     Sodium 08/15/2019 142     Potassium 08/15/2019 4 5     Chloride 08/15/2019 111*    CO2 08/15/2019 20*    ANION GAP 08/15/2019 11     BUN 08/15/2019 17     Creatinine 08/15/2019 0 92     Glucose 08/15/2019 98     Calcium 08/15/2019 7 9*    eGFR 08/15/2019 63      Results for Tony Landis (MRN [id number]) as of 8/15/2019 11:37   Ref   Range 8/13/2019 13:00 8/14/2019 05:45 8/14/2019 18:07 8/14/2019 20:46 8/15/2019 10:22   Sodium Latest Ref Range: 136 - 145 mmol/L  141   142   Potassium Latest Ref Range: 3 5 - 5 3 mmol/L  4 6   4 5   Chloride Latest Ref Range: 100 - 108 mmol/L  107   111 (H)   CO2 Latest Ref Range: 21 - 32 mmol/L  26   20 (L)   Anion Gap Latest Ref Range: 4 - 13 mmol/L  8   11   BUN Latest Ref Range: 5 - 25 mg/dL  30 (H)   17   Creatinine Latest Ref Range: 0 60 - 1 30 mg/dL  1 34 (H)   0 92   Glucose, Random Latest Ref Range: 65 - 140 mg/dL  110   98   Calcium Latest Ref Range: 8 3 - 10 1 mg/dL  8 5   7 9 (L)   AST Latest Ref Range: 5 - 45 U/L  24      ALT Latest Ref Range: 12 - 78 U/L  24      Alkaline Phosphatase Latest Ref Range: 46 - 116 U/L  57 Total Protein Latest Ref Range: 6 4 - 8 2 g/dL  5 7 (L)      Albumin Latest Ref Range: 3 5 - 5 0 g/dL  2 9 (L)      TOTAL BILIRUBIN Latest Ref Range: 0 20 - 1 00 mg/dL  0 60      eGFR Latest Units: ml/min/1 73sq m  40   63   Lipase Latest Ref Range: 73 - 393 u/L 183       WBC Latest Ref Range: 4 31 - 10 16 Thousand/uL  7 67   4 84   Red Blood Cell Count Latest Ref Range: 3 81 - 5 12 Million/uL  3 57 (L)   3 37 (L)   Hemoglobin Latest Ref Range: 11 5 - 15 4 g/dL  10 8 (L) 10 5 (L)  10 4 (L)   HCT Latest Ref Range: 34 8 - 46 1 %  33 9 (L) 32 6 (L)  32 6 (L)   MCV Latest Ref Range: 82 - 98 fL  95   97   MCH Latest Ref Range: 26 8 - 34 3 pg  30 3   30 9   MCHC Latest Ref Range: 31 4 - 37 4 g/dL  31 9   31 9   RDW Latest Ref Range: 11 6 - 15 1 %  13 2   13 5   Platelet Count Latest Ref Range: 149 - 390 Thousands/uL  172   166   MPV Latest Ref Range: 8 9 - 12 7 fL  10 9   10 9   nRBC Latest Units: /100 WBCs     0   Neutrophils % Latest Ref Range: 43 - 75 %     61   Immat GRANS % Latest Ref Range: 0 - 2 %     0   Lymphocytes Relative Latest Ref Range: 14 - 44 %     25   Monocytes Relative Latest Ref Range: 4 - 12 %     9   Eosinophils Latest Ref Range: 0 - 6 %     4   Basophils Relative Latest Ref Range: 0 - 1 %     1   Immature Grans Absolute Latest Ref Range: 0 00 - 0 20 Thousand/uL     0 02   Absolute Neutrophils Latest Ref Range: 1 85 - 7 62 Thousands/µL     2 90   Lymphocytes Absolute Latest Ref Range: 0 60 - 4 47 Thousands/µL     1 23   Absolute Monocytes Latest Ref Range: 0 17 - 1 22 Thousand/µL     0 45   Absolute Eosinophils Latest Ref Range: 0 00 - 0 61 Thousand/µL     0 18   Basophils Absolute Latest Ref Range: 0 00 - 0 10 Thousands/µL     0 06   Protime Latest Ref Range: 11 6 - 14 5 seconds 13 0       INR Latest Ref Range: 0 84 - 1 19  1 04       PTT Latest Ref Range: 23 - 37 seconds 27       TSH 3RD GENERATON Latest Ref Range: 0 358 - 3 740 uIU/mL 6 076 (H)       CLOSTRIDIUM DIFFICILE TOXIN BY PCR Unknown Rpt    C DIFF TOXIN B Latest Ref Range: NEGATIVE for C difficle toxin by PCR  NEGATIVE for C difficle toxin by PCR  ENTERIC BACTERIAL PANEL BY PCR Unknown    Rpt          Assessment/Plan:    1  Acute onset of abdominal pain and bloody diarrhea with CT scan demonstrating colitis from the hepatic flexure extending to the rectosigmoid region; clinically appears most likely infectious, viral versus bacterial; stool C diff and enteric bacterial panel have returned negative thus far  She appears to be clinically stable/improving at this time, leukocytosis has resolved, abdomen is not appear significantly tender on exam and she is afebrile      - will advance to full liquid diet with toast and crackers at this time  - check CBC and basic metabolic panel today  - monitor abdominal exam, vitals  - the patient continues to improve clinically, would recommend advancing to low residue diet  - also recommended strongly to patient that she have colonoscopy done in 4-6 weeks as outpatient

## 2019-08-15 NOTE — PLAN OF CARE
Problem: Potential for Falls  Goal: Patient will remain free of falls  Description  INTERVENTIONS:  - Assess patient frequently for physical needs  -  Identify cognitive and physical deficits and behaviors that affect risk of falls  -  [place] fall precautions as indicated by assessment   - Educate patient/family on patient safety including physical limitations  - Instruct patient to call for assistance with activity based on assessment  - Modify environment to reduce risk of injury  - Consider OT/PT consult to assist with strengthening/mobility  Outcome: Progressing     Problem: SAFETY ADULT  Goal: Patient will remain free of falls  Description  INTERVENTIONS:  - Assess patient frequently for physical needs  -  Identify cognitive and physical deficits and behaviors that affect risk of falls    -  [place] fall precautions as indicated by assessment   - Educate patient/family on patient safety including physical limitations  - Instruct patient to call for assistance with activity based on assessment  - Modify environment to reduce risk of injury  - Consider OT/PT consult to assist with strengthening/mobility  Outcome: Progressing  Goal: Maintain or return to baseline ADL function  Description  INTERVENTIONS:  -  Assess patient's ability to carry out ADLs; assess patient's baseline for ADL function and identify physical deficits which impact ability to perform ADLs (bathing, care of mouth/teeth, toileting, grooming, dressing, etc )  - Assess/evaluate cause of self-care deficits   - Assess range of motion  - Assess patient's mobility; develop plan if impaired  - Assess patient's need for assistive devices and provide as appropriate  - Encourage maximum independence but intervene and supervise when necessary  - Involve family in performance of ADLs  - Assess for home care needs following discharge   - Consider OT consult to assist with ADL evaluation and planning for discharge  - Provide patient education as appropriate  Outcome: Progressing

## 2019-08-15 NOTE — ASSESSMENT & PLAN NOTE
-Cr 1 85; likely due to volume loss from diarrhea and vomitting --> Resolved  -con't IVF, monitor Cr daily  -avoid nephrotoxins; hold Lisinopril

## 2019-08-15 NOTE — PLAN OF CARE
Problem: Potential for Falls  Goal: Patient will remain free of falls  Description  INTERVENTIONS:  - Assess patient frequently for physical needs  -  Identify cognitive and physical deficits and behaviors that affect risk of falls    -  [place] fall precautions as indicated by assessment   - Educate patient/family on patient safety including physical limitations  - Instruct patient to call for assistance with activity based on assessment  - Modify environment to reduce risk of injury  - Consider OT/PT consult to assist with strengthening/mobility  Outcome: Progressing     Problem: PAIN - ADULT  Goal: Verbalizes/displays adequate comfort level or baseline comfort level  Description  Interventions:  - Encourage patient to monitor pain and request assistance  - Assess pain using appropriate pain scale  - Administer analgesics based on type and severity of pain and evaluate response  - Implement non-pharmacological measures as appropriate and evaluate response  - Consider cultural and social influences on pain and pain management  - Notify physician/advanced practitioner if interventions unsuccessful or patient reports new pain  Outcome: Progressing     Problem: INFECTION - ADULT  Goal: Absence or prevention of progression during hospitalization  Description  INTERVENTIONS:  - Assess and monitor for signs and symptoms of infection  - Monitor lab/diagnostic results  - Monitor all insertion sites, i e  indwelling lines, tubes, and drains  - Monitor endotracheal if appropriate and nasal secretions for changes in amount and color  - [place] appropriate cooling/warming therapies per order  - Administer medications as ordered  - Instruct and encourage patient and family to use good hand hygiene technique  - Identify and instruct in appropriate isolation precautions for identified infection/condition  Outcome: Progressing  Goal: Absence of fever/infection during neutropenic period  Description  INTERVENTIONS:  - Monitor WBC    Outcome: Progressing     Problem: SAFETY ADULT  Goal: Patient will remain free of falls  Description  INTERVENTIONS:  - Assess patient frequently for physical needs  -  Identify cognitive and physical deficits and behaviors that affect risk of falls    -  [place] fall precautions as indicated by assessment   - Educate patient/family on patient safety including physical limitations  - Instruct patient to call for assistance with activity based on assessment  - Modify environment to reduce risk of injury  - Consider OT/PT consult to assist with strengthening/mobility  Outcome: Progressing  Goal: Maintain or return to baseline ADL function  Description  INTERVENTIONS:  -  Assess patient's ability to carry out ADLs; assess patient's baseline for ADL function and identify physical deficits which impact ability to perform ADLs (bathing, care of mouth/teeth, toileting, grooming, dressing, etc )  - Assess/evaluate cause of self-care deficits   - Assess range of motion  - Assess patient's mobility; develop plan if impaired  - Assess patient's need for assistive devices and provide as appropriate  - Encourage maximum independence but intervene and supervise when necessary  - Involve family in performance of ADLs  - Assess for home care needs following discharge   - Consider OT consult to assist with ADL evaluation and planning for discharge  - Provide patient education as appropriate  Outcome: Progressing  Goal: Maintain or return mobility status to optimal level  Description  INTERVENTIONS:  - Assess patient's baseline mobility status (ambulation, transfers, stairs, etc )    - Identify cognitive and physical deficits and behaviors that affect mobility  - Identify mobility aids required to assist with transfers and/or ambulation (gait belt, sit-to-stand, lift, walker, cane, etc )  - [place] fall precautions as indicated by assessment  - Record patient progress and toleration of activity level on Mobility SBAR; progress patient to next Phase/Stage  - Instruct patient to call for assistance with activity based on assessment  - Consider rehabilitation consult to assist with strengthening/weightbearing, etc   Outcome: Progressing     Problem: DISCHARGE PLANNING  Goal: Discharge to home or other facility with appropriate resources  Description  INTERVENTIONS:  - Identify barriers to discharge w/patient and caregiver  - Arrange for needed discharge resources and transportation as appropriate  - Identify discharge learning needs (meds, wound care, etc )  - Arrange for interpretive services to assist at discharge as needed  - Refer to Case Management Department for coordinating discharge planning if the patient needs post-hospital services based on physician/advanced practitioner order or complex needs related to functional status, cognitive ability, or social support system  Outcome: Progressing     Problem: Knowledge Deficit  Goal: Patient/family/caregiver demonstrates understanding of disease process, treatment plan, medications, and discharge instructions  Description  Complete learning assessment and assess knowledge base    Interventions:  - Provide teaching at level of understanding  - Provide teaching via preferred learning methods  Outcome: Progressing

## 2019-08-15 NOTE — ASSESSMENT & PLAN NOTE
-On admission, chief complaint of multiple episodes of bloody diarrhea with abdominal pain --> resolving  -CT-abd: evidence of Acute Colitis from the hepatic flexure extending into the rectosigmoid region   -con't IV Ciprofloxacin 200mg q24 and Flagyl IV 500mg q8   -con't IVF  -C   Diff toxin and Stool Enteric Bacterial panel negative  -GI consulted for continued rectal bleeding (now improving); advance to Full liquid diet with toast and crackers, con't to monitor with abdominal exams, vitals, daily BMP & CBC   -follow up with out patient GI for colonoscopy in 4-6 weeks

## 2019-08-16 VITALS
RESPIRATION RATE: 18 BRPM | OXYGEN SATURATION: 93 % | TEMPERATURE: 98.1 F | HEART RATE: 72 BPM | SYSTOLIC BLOOD PRESSURE: 157 MMHG | DIASTOLIC BLOOD PRESSURE: 82 MMHG | WEIGHT: 131.4 LBS | HEIGHT: 59 IN | BODY MASS INDEX: 26.49 KG/M2

## 2019-08-16 LAB
ANION GAP SERPL CALCULATED.3IONS-SCNC: 11 MMOL/L (ref 4–13)
BUN SERPL-MCNC: 11 MG/DL (ref 5–25)
CALCIUM SERPL-MCNC: 8 MG/DL (ref 8.3–10.1)
CHLORIDE SERPL-SCNC: 110 MMOL/L (ref 100–108)
CO2 SERPL-SCNC: 24 MMOL/L (ref 21–32)
CREAT SERPL-MCNC: 0.97 MG/DL (ref 0.6–1.3)
ERYTHROCYTE [DISTWIDTH] IN BLOOD BY AUTOMATED COUNT: 13.3 % (ref 11.6–15.1)
GFR SERPL CREATININE-BSD FRML MDRD: 59 ML/MIN/1.73SQ M
GLUCOSE SERPL-MCNC: 99 MG/DL (ref 65–140)
HCT VFR BLD AUTO: 35.1 % (ref 34.8–46.1)
HGB BLD-MCNC: 10.9 G/DL (ref 11.5–15.4)
MCH RBC QN AUTO: 30.8 PG (ref 26.8–34.3)
MCHC RBC AUTO-ENTMCNC: 31.1 G/DL (ref 31.4–37.4)
MCV RBC AUTO: 99 FL (ref 82–98)
PLATELET # BLD AUTO: 166 THOUSANDS/UL (ref 149–390)
PMV BLD AUTO: 11.9 FL (ref 8.9–12.7)
POTASSIUM SERPL-SCNC: 4.5 MMOL/L (ref 3.5–5.3)
RBC # BLD AUTO: 3.54 MILLION/UL (ref 3.81–5.12)
SODIUM SERPL-SCNC: 145 MMOL/L (ref 136–145)
WBC # BLD AUTO: 4.68 THOUSAND/UL (ref 4.31–10.16)

## 2019-08-16 PROCEDURE — 80048 BASIC METABOLIC PNL TOTAL CA: CPT | Performed by: PSYCHIATRY & NEUROLOGY

## 2019-08-16 PROCEDURE — 99238 HOSP IP/OBS DSCHRG MGMT 30/<: CPT | Performed by: INTERNAL MEDICINE

## 2019-08-16 PROCEDURE — 85027 COMPLETE CBC AUTOMATED: CPT | Performed by: PSYCHIATRY & NEUROLOGY

## 2019-08-16 RX ORDER — CIPROFLOXACIN 500 MG/1
500 TABLET, FILM COATED ORAL EVERY 12 HOURS SCHEDULED
Qty: 2 TABLET | Refills: 0 | Status: SHIPPED | OUTPATIENT
Start: 2019-08-16 | End: 2019-08-17

## 2019-08-16 RX ORDER — METRONIDAZOLE 500 MG/1
500 TABLET ORAL EVERY 8 HOURS SCHEDULED
Qty: 3 TABLET | Refills: 0 | Status: SHIPPED | OUTPATIENT
Start: 2019-08-16 | End: 2019-08-17

## 2019-08-16 RX ADMIN — HEPARIN SODIUM 5000 UNITS: 5000 INJECTION INTRAVENOUS; SUBCUTANEOUS at 05:40

## 2019-08-16 RX ADMIN — METRONIDAZOLE 500 MG: 500 INJECTION, SOLUTION INTRAVENOUS at 07:23

## 2019-08-16 NOTE — DISCHARGE SUMMARY
Discharge- Santosh Taylor 1945, 72 y o  female MRN: [id number]    Unit/Bed#: -[place] Encounter: [id number]    Primary Care Provider: No primary care provider on file  Date and time admitted to hospital: 8/13/2019 12:07 PM        * Possible Acute Bacterial Colitis  Assessment & Plan  -On admission, chief complaint of multiple episodes of bloody diarrhea with abdominal pain --> resolving  -CT-Abd: evidence of Acute Colitis from the hepatic flexure extending into the rectosigmoid region   -treated with IV Fluids and IV Antibiotics (Ciprofloxacin and Flagyl)  -C  Diff toxin and Stool Enteric Bacterial panel negative  -GI consulted for continued rectal bleeding (now resolved); tolerated Full liquid diet with toast and crackers --> discharged on regular diet   -follow up with out patient GI for colonoscopy in 4-6 weeks    HERB (acute kidney injury) ([facility] [address] )  Assessment & Plan  -Cr 1 85; likely due to volume loss from diarrhea and vomitting --> Resolved    Hyperkalemia  Assessment & Plan  -K 5 5 on admission --> Resolved    History of hypertension  Assessment & Plan  -BP stable, con't to monitor  -resume home meds upon discharge    History of hypothyroidism  Assessment & Plan  -TSH: 6 076  -con't home meds (levothyroxine) upon discharge    History of Overactive bladder  Assessment & Plan  -con't home meds upon discharge          Discharging Physician / Practitioner: Aakash Gonzales MD  PCP: No primary care provider on file    Admission Date:   Admission Orders (From admission, onward)     Ordered        08/14/19 1328  Inpatient Admission  Once         08/13/19 1653  Place in Observation (expected length of stay for this patient is less than two midnights)  Once                   Discharge Date: 08/16/19    Resolved Problems  Date Reviewed: 8/16/2019    None          Consultations During Hospital Stay:  · Gastroenterology    Procedures Performed:   · None    Significant Findings / Test Results: · None    Incidental Findings:   · None     Test Results Pending at Discharge (will require follow up): · None     Outpatient Tests Requested:  · Colonoscopy    Complications:  None    Reason for Admission: Acute Colitis    Hospital Course:     Chung Schwartz is a 72 y o  female patient who originally presented to the hospital on 8/13/2019 due to multiple episodes of bloody diarrhea/rectal bleeding with abdominal pain  In ED CT-Abd confirmed Acute Colitis and pt was treated with IV antibiotics (Ciprofloxacin and Flagyl) & IV fluids  GI was consulted for continued rectal bleeding and the pt was placed on a full liquid diet, with a decrease in bleeding  Her diet was advanced as tolerated and she was discharged in stable condition with no further rectal bleeding or further complaints  A plan was made for the patient to follow up with GI in [place] for a colonoscopy in 4-6 weeks upon discharge  Please see above list of diagnoses and related plan for additional information  Condition at Discharge: good     Discharge Day Visit / Exam:     Subjective:  Pt denies any further rectal bleeding or abdominal pain today  She is tolerating her diet and denies any nausea/vomitting  She has no further complaints at this time  Vitals: Blood Pressure: 157/82 (08/16/19 0804)  Pulse: 72 (08/16/19 0804)  Temperature: 98 1 °F (36 7 °C) (08/16/19 0804)  Temp Source: Oral (08/16/19 0804)  Respirations: 18 (08/16/19 0804)  Height: 4' 11" (149 9 cm) (08/13/19 1739)  Weight - Scale: 59 6 kg (131 lb 6 4 oz) (08/16/19 0600)  SpO2: 93 % (08/16/19 0804)  Exam:   Physical Exam   Constitutional: She is oriented to person, place, and time  She appears well-developed and well-nourished  HENT:   Head: Normocephalic and atraumatic  Eyes: Pupils are equal, round, and reactive to light  Conjunctivae and EOM are normal    Neck: Normal range of motion  Neck supple  Cardiovascular: Normal rate, regular rhythm and normal heart sounds  Pulmonary/Chest: Effort normal and breath sounds normal    Abdominal: Soft  Bowel sounds are normal  She exhibits no distension  There is no tenderness  There is no rebound and no guarding  Musculoskeletal: Normal range of motion  Neurological: She is alert and oriented to person, place, and time  Skin: Skin is warm and dry  Capillary refill takes less than 2 seconds  Psychiatric: She has a normal mood and affect  Discussion with Family: Spoke with the pts daughter Karlee Begum) and her friend Magen Blackburn, who will be picking her up from the hospital today  Discharge instructions/Information to patient and family:   See after visit summary for information provided to patient and family  Provisions for Follow-Up Care:  See after visit summary for information related to follow-up care and any pertinent home health orders  Disposition:     Home    For Discharges to [facility] SNF:   · Not Applicable to this Patient - Not Applicable to this Patient    Planned Readmission: No     Discharge Statement:  I spent 45 minutes discharging the patient  This time was spent on the day of discharge  I had direct contact with the patient on the day of discharge  Greater than 50% of the total time was spent examining patient, answering all patient questions, arranging and discussing plan of care with patient as well as directly providing post-discharge instructions  Additional time then spent on discharge activities  Discharge Medications:  See after visit summary for reconciled discharge medications provided to patient and family        ** Please Note: This note has been constructed using a voice recognition system **

## 2019-08-16 NOTE — PLAN OF CARE
Problem: Potential for Falls  Goal: Patient will remain free of falls  Description  INTERVENTIONS:  - Assess patient frequently for physical needs  -  Identify cognitive and physical deficits and behaviors that affect risk of falls    -  [place] fall precautions as indicated by assessment   - Educate patient/family on patient safety including physical limitations  - Instruct patient to call for assistance with activity based on assessment  - Modify environment to reduce risk of injury  - Consider OT/PT consult to assist with strengthening/mobility  Outcome: Completed     Problem: PAIN - ADULT  Goal: Verbalizes/displays adequate comfort level or baseline comfort level  Description  Interventions:  - Encourage patient to monitor pain and request assistance  - Assess pain using appropriate pain scale  - Administer analgesics based on type and severity of pain and evaluate response  - Implement non-pharmacological measures as appropriate and evaluate response  - Consider cultural and social influences on pain and pain management  - Notify physician/advanced practitioner if interventions unsuccessful or patient reports new pain  Outcome: Completed     Problem: INFECTION - ADULT  Goal: Absence or prevention of progression during hospitalization  Description  INTERVENTIONS:  - Assess and monitor for signs and symptoms of infection  - Monitor lab/diagnostic results  - Monitor all insertion sites, i e  indwelling lines, tubes, and drains  - Monitor endotracheal if appropriate and nasal secretions for changes in amount and color  - [place] appropriate cooling/warming therapies per order  - Administer medications as ordered  - Instruct and encourage patient and family to use good hand hygiene technique  - Identify and instruct in appropriate isolation precautions for identified infection/condition  Outcome: Completed  Goal: Absence of fever/infection during neutropenic period  Description  INTERVENTIONS:  - Monitor WBC    Outcome: Completed

## 2019-08-16 NOTE — ASSESSMENT & PLAN NOTE
-On admission, chief complaint of multiple episodes of bloody diarrhea with abdominal pain --> resolving  -CT-Abd: evidence of Acute Colitis from the hepatic flexure extending into the rectosigmoid region   -treated with IV Ciprofloxacin 200mg q24 and Flagyl IV 500mg q8   -con't IVF  -C   Diff toxin and Stool Enteric Bacterial panel negative  -GI consulted for continued rectal bleeding (now improving); tolerated Full liquid diet with toast and crackers --> discharged on regular diet   -follow up with out patient GI for colonoscopy in 4-6 weeks